# Patient Record
Sex: FEMALE | Race: WHITE | NOT HISPANIC OR LATINO
[De-identification: names, ages, dates, MRNs, and addresses within clinical notes are randomized per-mention and may not be internally consistent; named-entity substitution may affect disease eponyms.]

---

## 2014-12-13 RX ORDER — ASPIRIN/CALCIUM CARB/MAGNESIUM 324 MG
1 TABLET ORAL
Qty: 0 | Refills: 0 | COMMUNITY
Start: 2014-12-13

## 2017-01-03 ENCOUNTER — RX RENEWAL (OUTPATIENT)
Age: 82
End: 2017-01-03

## 2017-02-02 ENCOUNTER — RX RENEWAL (OUTPATIENT)
Age: 82
End: 2017-02-02

## 2017-02-21 ENCOUNTER — APPOINTMENT (OUTPATIENT)
Dept: PULMONOLOGY | Facility: CLINIC | Age: 82
End: 2017-02-21

## 2017-02-21 VITALS
HEART RATE: 81 BPM | SYSTOLIC BLOOD PRESSURE: 118 MMHG | BODY MASS INDEX: 17.75 KG/M2 | OXYGEN SATURATION: 96 % | DIASTOLIC BLOOD PRESSURE: 60 MMHG | WEIGHT: 110 LBS

## 2017-02-21 DIAGNOSIS — Z87.891 PERSONAL HISTORY OF NICOTINE DEPENDENCE: ICD-10-CM

## 2017-02-28 ENCOUNTER — LABORATORY RESULT (OUTPATIENT)
Age: 82
End: 2017-02-28

## 2017-02-28 ENCOUNTER — APPOINTMENT (OUTPATIENT)
Dept: FAMILY MEDICINE | Facility: CLINIC | Age: 82
End: 2017-02-28

## 2017-02-28 VITALS
SYSTOLIC BLOOD PRESSURE: 126 MMHG | OXYGEN SATURATION: 98 % | HEIGHT: 66 IN | BODY MASS INDEX: 17.68 KG/M2 | WEIGHT: 110 LBS | HEART RATE: 75 BPM | RESPIRATION RATE: 12 BRPM | DIASTOLIC BLOOD PRESSURE: 74 MMHG

## 2017-02-28 DIAGNOSIS — R06.2 WHEEZING: ICD-10-CM

## 2017-02-28 DIAGNOSIS — Z23 ENCOUNTER FOR IMMUNIZATION: ICD-10-CM

## 2017-02-28 DIAGNOSIS — R06.00 DYSPNEA, UNSPECIFIED: ICD-10-CM

## 2017-02-28 DIAGNOSIS — M25.562 PAIN IN LEFT KNEE: ICD-10-CM

## 2017-02-28 DIAGNOSIS — M25.559 PAIN IN UNSPECIFIED HIP: ICD-10-CM

## 2017-03-01 LAB
24R-OH-CALCIDIOL SERPL-MCNC: 35.6 PG/ML
ALBUMIN SERPL ELPH-MCNC: 3.9 G/DL
ALP BLD-CCNC: 77 U/L
ALT SERPL-CCNC: 14 U/L
ANION GAP SERPL CALC-SCNC: 16 MMOL/L
AST SERPL-CCNC: 20 U/L
BASOPHILS # BLD AUTO: 0.03 K/UL
BASOPHILS NFR BLD AUTO: 0.6 %
BILIRUB SERPL-MCNC: 0.3 MG/DL
BUN SERPL-MCNC: 11 MG/DL
CALCIUM SERPL-MCNC: 9.4 MG/DL
CHLORIDE SERPL-SCNC: 102 MMOL/L
CHOLEST SERPL-MCNC: 135 MG/DL
CHOLEST/HDLC SERPL: 1.7 RATIO
CO2 SERPL-SCNC: 25 MMOL/L
CREAT SERPL-MCNC: 0.68 MG/DL
EOSINOPHIL # BLD AUTO: 0.05 K/UL
EOSINOPHIL NFR BLD AUTO: 0.9 %
ERYTHROCYTE [SEDIMENTATION RATE] IN BLOOD BY WESTERGREN METHOD: 31 MM/HR
FOLATE SERPL-MCNC: 11.6 NG/ML
GLUCOSE SERPL-MCNC: 83 MG/DL
HCT VFR BLD CALC: 30.1 %
HDLC SERPL-MCNC: 80 MG/DL
HGB BLD-MCNC: 8.9 G/DL
IMM GRANULOCYTES NFR BLD AUTO: 0.4 %
LDLC SERPL CALC-MCNC: 42 MG/DL
LYMPHOCYTES # BLD AUTO: 0.83 K/UL
LYMPHOCYTES NFR BLD AUTO: 15.7 %
MAN DIFF?: NORMAL
MCHC RBC-ENTMCNC: 19.9 PG
MCHC RBC-ENTMCNC: 29.6 GM/DL
MCV RBC AUTO: 67.2 FL
MONOCYTES # BLD AUTO: 0.32 K/UL
MONOCYTES NFR BLD AUTO: 6 %
NEUTROPHILS # BLD AUTO: 4.05 K/UL
NEUTROPHILS NFR BLD AUTO: 76.4 %
PLATELET # BLD AUTO: 330 K/UL
POTASSIUM SERPL-SCNC: 3.7 MMOL/L
PROT SERPL-MCNC: 6.2 G/DL
RBC # BLD: 4.48 M/UL
RBC # FLD: 16.7 %
SODIUM SERPL-SCNC: 143 MMOL/L
TRIGL SERPL-MCNC: 65 MG/DL
TSH SERPL-ACNC: 4.19 UIU/ML
VIT B12 SERPL-MCNC: 300 PG/ML
WBC # FLD AUTO: 5.3 K/UL

## 2017-04-10 ENCOUNTER — RX RENEWAL (OUTPATIENT)
Age: 82
End: 2017-04-10

## 2017-04-24 ENCOUNTER — RX RENEWAL (OUTPATIENT)
Age: 82
End: 2017-04-24

## 2017-05-04 ENCOUNTER — LABORATORY RESULT (OUTPATIENT)
Age: 82
End: 2017-05-04

## 2017-05-05 ENCOUNTER — APPOINTMENT (OUTPATIENT)
Dept: FAMILY MEDICINE | Facility: CLINIC | Age: 82
End: 2017-05-05

## 2017-05-05 VITALS
DIASTOLIC BLOOD PRESSURE: 58 MMHG | BODY MASS INDEX: 17.68 KG/M2 | HEART RATE: 74 BPM | WEIGHT: 110 LBS | TEMPERATURE: 99.2 F | OXYGEN SATURATION: 96 % | HEIGHT: 66 IN | SYSTOLIC BLOOD PRESSURE: 134 MMHG

## 2017-05-09 ENCOUNTER — RESULT REVIEW (OUTPATIENT)
Age: 82
End: 2017-05-09

## 2017-05-09 LAB
ALBUMIN SERPL ELPH-MCNC: 4.1 G/DL
ALP BLD-CCNC: 88 U/L
ALT SERPL-CCNC: 12 U/L
ANION GAP SERPL CALC-SCNC: 15 MMOL/L
AST SERPL-CCNC: 23 U/L
BASOPHILS # BLD AUTO: 0.05 K/UL
BASOPHILS NFR BLD AUTO: 0.7 %
BILIRUB SERPL-MCNC: 0.3 MG/DL
BUN SERPL-MCNC: 13 MG/DL
CALCIUM SERPL-MCNC: 9.8 MG/DL
CHLORIDE SERPL-SCNC: 101 MMOL/L
CO2 SERPL-SCNC: 25 MMOL/L
CREAT SERPL-MCNC: 0.82 MG/DL
EOSINOPHIL # BLD AUTO: 0.1 K/UL
EOSINOPHIL NFR BLD AUTO: 1.4 %
GLUCOSE SERPL-MCNC: 89 MG/DL
HCT VFR BLD CALC: 30.6 %
HGB BLD-MCNC: 9.3 G/DL
IMM GRANULOCYTES NFR BLD AUTO: 0.4 %
LYMPHOCYTES # BLD AUTO: 1.05 K/UL
LYMPHOCYTES NFR BLD AUTO: 15.2 %
MAN DIFF?: NORMAL
MCHC RBC-ENTMCNC: 19.7 PG
MCHC RBC-ENTMCNC: 30.4 GM/DL
MCV RBC AUTO: 64.8 FL
MONOCYTES # BLD AUTO: 0.53 K/UL
MONOCYTES NFR BLD AUTO: 7.7 %
NEUTROPHILS # BLD AUTO: 5.14 K/UL
NEUTROPHILS NFR BLD AUTO: 74.6 %
PLATELET # BLD AUTO: 421 K/UL
POTASSIUM SERPL-SCNC: 4.4 MMOL/L
PROT SERPL-MCNC: 6.7 G/DL
RBC # BLD: 4.72 M/UL
RBC # FLD: 16.2 %
SODIUM SERPL-SCNC: 141 MMOL/L
WBC # FLD AUTO: 6.9 K/UL

## 2017-05-12 ENCOUNTER — APPOINTMENT (OUTPATIENT)
Dept: FAMILY MEDICINE | Facility: CLINIC | Age: 82
End: 2017-05-12

## 2017-05-12 VITALS
TEMPERATURE: 98 F | OXYGEN SATURATION: 98 % | BODY MASS INDEX: 17.68 KG/M2 | DIASTOLIC BLOOD PRESSURE: 52 MMHG | RESPIRATION RATE: 10 BRPM | HEART RATE: 81 BPM | SYSTOLIC BLOOD PRESSURE: 126 MMHG | HEIGHT: 66 IN | WEIGHT: 110 LBS

## 2017-05-12 DIAGNOSIS — Z86.2 PERSONAL HISTORY OF DISEASES OF THE BLOOD AND BLOOD-FORMING ORGANS AND CERTAIN DISORDERS INVOLVING THE IMMUNE MECHANISM: ICD-10-CM

## 2017-05-14 ENCOUNTER — EMERGENCY (EMERGENCY)
Facility: HOSPITAL | Age: 82
LOS: 1 days | Discharge: HOME | End: 2017-05-14
Attending: EMERGENCY MEDICINE
Payer: MEDICARE

## 2017-05-14 VITALS
WEIGHT: 110.01 LBS | TEMPERATURE: 98 F | RESPIRATION RATE: 20 BRPM | HEIGHT: 66 IN | DIASTOLIC BLOOD PRESSURE: 78 MMHG | SYSTOLIC BLOOD PRESSURE: 139 MMHG | HEART RATE: 77 BPM | OXYGEN SATURATION: 96 %

## 2017-05-14 LAB
ANISOCYTOSIS BLD QL: SLIGHT — SIGNIFICANT CHANGE UP
APTT BLD: 27.6 SEC — SIGNIFICANT CHANGE UP (ref 27.5–37.4)
BASOPHILS NFR BLD AUTO: 1 % — SIGNIFICANT CHANGE UP (ref 0–2)
ELLIPTOCYTES BLD QL SMEAR: SLIGHT — SIGNIFICANT CHANGE UP
HCT VFR BLD CALC: 26.9 % — LOW (ref 37–47)
HGB BLD-MCNC: 8.3 G/DL — LOW (ref 12–16)
HYPOCHROMIA BLD QL: SLIGHT — SIGNIFICANT CHANGE UP
INR BLD: 1.04 RATIO — SIGNIFICANT CHANGE UP (ref 0.88–1.16)
LYMPHOCYTES # BLD AUTO: 8 % — LOW (ref 20–55)
MACROCYTES BLD QL: SLIGHT — SIGNIFICANT CHANGE UP
MCHC RBC-ENTMCNC: 20 PG — LOW (ref 27–31)
MCHC RBC-ENTMCNC: 30.9 G/DL — LOW (ref 32–36)
MCV RBC AUTO: 64.7 FL — LOW (ref 81–99)
MONOCYTES NFR BLD AUTO: 5 % — SIGNIFICANT CHANGE UP (ref 3–10)
NEUTROPHILS NFR BLD AUTO: 86 % — HIGH (ref 37–73)
OVALOCYTES BLD QL SMEAR: SLIGHT — SIGNIFICANT CHANGE UP
PLAT MORPH BLD: NORMAL — SIGNIFICANT CHANGE UP
PLATELET # BLD AUTO: 342 K/UL — SIGNIFICANT CHANGE UP (ref 150–400)
POIKILOCYTOSIS BLD QL AUTO: SLIGHT — SIGNIFICANT CHANGE UP
POLYCHROMASIA BLD QL SMEAR: SLIGHT — SIGNIFICANT CHANGE UP
PROTHROM AB SERPL-ACNC: 11.4 SEC — SIGNIFICANT CHANGE UP (ref 9.8–12.7)
RBC # BLD: 4.16 M/UL — LOW (ref 4.4–5.2)
RBC # FLD: 16 % — HIGH (ref 11–15.6)
RBC BLD AUTO: ABNORMAL
WBC # BLD: 7.1 K/UL — SIGNIFICANT CHANGE UP (ref 4.8–10.8)
WBC # FLD AUTO: 7.1 K/UL — SIGNIFICANT CHANGE UP (ref 4.8–10.8)

## 2017-05-14 PROCEDURE — 99284 EMERGENCY DEPT VISIT MOD MDM: CPT

## 2017-05-14 PROCEDURE — 99284 EMERGENCY DEPT VISIT MOD MDM: CPT | Mod: 25

## 2017-05-14 PROCEDURE — 85027 COMPLETE CBC AUTOMATED: CPT

## 2017-05-14 PROCEDURE — 85610 PROTHROMBIN TIME: CPT

## 2017-05-14 PROCEDURE — 93971 EXTREMITY STUDY: CPT

## 2017-05-14 PROCEDURE — 73590 X-RAY EXAM OF LOWER LEG: CPT

## 2017-05-14 PROCEDURE — 85730 THROMBOPLASTIN TIME PARTIAL: CPT

## 2017-05-14 PROCEDURE — 93971 EXTREMITY STUDY: CPT | Mod: 26,RT

## 2017-05-14 PROCEDURE — 73590 X-RAY EXAM OF LOWER LEG: CPT | Mod: 26,RT

## 2017-05-14 NOTE — ED STATDOCS - ATTENDING CONTRIBUTION TO CARE
I, Donald Zuniga, performed the initial face to face bedside interview with this patient regarding history of present illness, review of symptoms and relevant past medical, social and family history.  I completed an independent physical examination.  I was the provider who initially evaluated this patient.  The history, relevant review of systems, past medical and surgical history, medical decision making, and physical examination was documented by the scribe in my presence and I attest to the accuracy of the documentation. Follow-up on ordered tests (ie labs, radiologic studies) and re-evaluation of the patient's status has been communicated to the ACP.  Disposition of the patient will be based on test outcome and response to ED interventions.

## 2017-05-14 NOTE — ED STATDOCS - PMH
COPD (chronic obstructive pulmonary disease)    Diabetes    High cholesterol    Thalassanemia, minor

## 2017-05-14 NOTE — ED STATDOCS - NS ED MD SCRIBE ATTENDING SCRIBE SECTIONS
HISTORY OF PRESENT ILLNESS/HIV/REVIEW OF SYSTEMS/INTAKE ASSESSMENT/SCREENINGS/PHYSICAL EXAM/DISPOSITION/PAST MEDICAL/SURGICAL/SOCIAL HISTORY/VITAL SIGNS( Pullset)

## 2017-05-14 NOTE — ED STATDOCS - OBJECTIVE STATEMENT
84 y/o F with hx of HTN, DM, and HLD presenting to the ED complaining of bruising to RLE. Son remarks that pt had slight swelling to RLE. This morning they noticed a bruise to RLE. They called their cousin, an RN, who evaluated to leg and found it to be swollen and tender. Pt was recently seen by PMD and put on Cipro x 2 weeks ago. Son adds that pt has a habit of picking her skin. Pt wheelchair bound at baseline x 17 months s/p right hip fx.   Medication: Plavix and currently on Cipro 86 y/o F with hx of HTN, DM, and HLD presenting to the ED complaining of bruising to RLE. Son remarks that he noted slight swelling to RLE last night, prior to patient going to bed. This morning they noticed a bruise to RLE. Patient denies trauma/ injury but does take plavix.  Patient denies leg pain, weakness, lightheadedness.  In addition, patient has had an infection of the opposite leg for more than 3 weeks: blister that opened up and became infected: Pt was recently seen by PMD and put on Cipro x 2 weeks ago.  Denies fever.  Son adds that pt has a habit of picking her skin. Pt wheelchair bound at baseline x 17 months s/p right hip fx.   Medication: Plavix and currently on Cipro

## 2017-05-14 NOTE — ED STATDOCS - SKIN, MLM
LLE erosion 3x2cm ,oval shape, to anterior aspect of the distal tibia with minimal surround erythema, 6x5cm hematoma to medial aspect of right tibia, surrounding warm, no erythema, +ecchymosis. dry skin. Bruises b/l upper extremities. LLE erosion 3x2cm ,oval shape, to anterior aspect of the distal tibia with minimal surround erythema.  6x5cm hematoma to medial aspect of right tibia, surrounding warm, no erythema, +ecchymosis. dry skin. Bruises b/l upper extremities.

## 2017-05-14 NOTE — ED STATDOCS - PROGRESS NOTE DETAILS
NP NOTE: Pt seen by intake physician and HPI/ROS/PE/MDM reviewed. Pt seen and evaluated. Discussed plan and any resulted studies at this time. Will continue to monitor and re-evaluate.  Re-Evaluation: pt pending studies, + hematoma/contusion that is warm and tender to palpation right medial aspect of lower egg. discussed CBC and xray results with pt and family. pt going to US at this time, will continue to monitor. Labs and imaging results reviewed.  Patient advised of results. Anticipatory guidance provided. will apply light ace wrap, fu with pmd.

## 2017-05-14 NOTE — ED ADULT NURSE NOTE - OBJECTIVE STATEMENT
Pt A&Ox3, no c/o pain at this time, has bruising to RLE with swelling +2 edema, warm and tender to touch, positive distal pulses. Pt is immobile bound to wheelchair. Has healing scab to LLE, on antibiotics for infection at that site. Pt son at bedside HCP,

## 2017-05-14 NOTE — ED ADULT TRIAGE NOTE - CHIEF COMPLAINT QUOTE
pt brought to er with bruise and pain  to right lower leg . unknown if pt sustained any injury. pt is on plavix. pt with minor lac to right wrist. pt with hx of copd

## 2017-05-14 NOTE — ED STATDOCS - MEDICAL DECISION MAKING DETAILS
chronic wound LLE, hematoma RLE, evaluate for coag abnormality and DVT. if negative outpt management with ace bandages and f/u with PMD. chronic wound LLE: on antibiotics.  hematoma and swelling RLE, evaluate for coag abnormality and DVT. if negative outpt wound care management with ace bandages and f/u with PMD.

## 2017-05-19 ENCOUNTER — APPOINTMENT (OUTPATIENT)
Dept: FAMILY MEDICINE | Facility: CLINIC | Age: 82
End: 2017-05-19

## 2017-05-19 VITALS
HEIGHT: 66 IN | RESPIRATION RATE: 97 BRPM | BODY MASS INDEX: 17.36 KG/M2 | TEMPERATURE: 98.6 F | DIASTOLIC BLOOD PRESSURE: 38 MMHG | HEART RATE: 65 BPM | SYSTOLIC BLOOD PRESSURE: 112 MMHG | WEIGHT: 108 LBS

## 2017-05-26 ENCOUNTER — APPOINTMENT (OUTPATIENT)
Dept: FAMILY MEDICINE | Facility: CLINIC | Age: 82
End: 2017-05-26

## 2017-05-26 VITALS
HEIGHT: 66 IN | WEIGHT: 108 LBS | BODY MASS INDEX: 17.36 KG/M2 | HEART RATE: 86 BPM | TEMPERATURE: 98.1 F | DIASTOLIC BLOOD PRESSURE: 50 MMHG | RESPIRATION RATE: 10 BRPM | SYSTOLIC BLOOD PRESSURE: 116 MMHG | OXYGEN SATURATION: 98 %

## 2017-05-26 DIAGNOSIS — L03.115 CELLULITIS OF RIGHT LOWER LIMB: ICD-10-CM

## 2017-06-04 ENCOUNTER — EMERGENCY (EMERGENCY)
Facility: HOSPITAL | Age: 82
LOS: 1 days | Discharge: HOME | End: 2017-06-04
Attending: EMERGENCY MEDICINE | Admitting: EMERGENCY MEDICINE
Payer: MEDICARE

## 2017-06-04 VITALS
SYSTOLIC BLOOD PRESSURE: 135 MMHG | WEIGHT: 130.07 LBS | DIASTOLIC BLOOD PRESSURE: 60 MMHG | RESPIRATION RATE: 16 BRPM | HEIGHT: 66 IN | OXYGEN SATURATION: 98 % | HEART RATE: 89 BPM | TEMPERATURE: 98 F
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
## 2017-06-04 LAB
ALBUMIN SERPL ELPH-MCNC: 3.9 G/DL — SIGNIFICANT CHANGE UP (ref 3.3–5.2)
ALP SERPL-CCNC: 118 U/L — SIGNIFICANT CHANGE UP (ref 40–120)
ALT FLD-CCNC: 13 U/L — SIGNIFICANT CHANGE UP
ANION GAP SERPL CALC-SCNC: 13 MMOL/L — SIGNIFICANT CHANGE UP (ref 5–17)
ANISOCYTOSIS BLD QL: SIGNIFICANT CHANGE UP
APTT BLD: 27.1 SEC — LOW (ref 27.5–37.4)
AST SERPL-CCNC: 24 U/L — SIGNIFICANT CHANGE UP
BASOPHILS # BLD AUTO: 0 K/UL — SIGNIFICANT CHANGE UP (ref 0–0.2)
BASOPHILS NFR BLD AUTO: 0.2 % — SIGNIFICANT CHANGE UP (ref 0–2)
BILIRUB SERPL-MCNC: 0.5 MG/DL — SIGNIFICANT CHANGE UP (ref 0.4–2)
BLD GP AB SCN SERPL QL: SIGNIFICANT CHANGE UP
BUN SERPL-MCNC: 11 MG/DL — SIGNIFICANT CHANGE UP (ref 8–20)
CALCIUM SERPL-MCNC: 8.8 MG/DL — SIGNIFICANT CHANGE UP (ref 8.6–10.2)
CHLORIDE SERPL-SCNC: 101 MMOL/L — SIGNIFICANT CHANGE UP (ref 98–107)
CO2 SERPL-SCNC: 26 MMOL/L — SIGNIFICANT CHANGE UP (ref 22–29)
CREAT SERPL-MCNC: 0.67 MG/DL — SIGNIFICANT CHANGE UP (ref 0.5–1.3)
DACRYOCYTES BLD QL SMEAR: SLIGHT — SIGNIFICANT CHANGE UP
ELLIPTOCYTES BLD QL SMEAR: SIGNIFICANT CHANGE UP
EOSINOPHIL # BLD AUTO: 0.1 K/UL — SIGNIFICANT CHANGE UP (ref 0–0.5)
EOSINOPHIL NFR BLD AUTO: 1.2 % — SIGNIFICANT CHANGE UP (ref 0–6)
GLUCOSE SERPL-MCNC: 153 MG/DL — HIGH (ref 70–115)
HCT VFR BLD CALC: 25.4 % — LOW (ref 37–47)
HGB BLD-MCNC: 7.6 G/DL — LOW (ref 12–16)
HYPOCHROMIA BLD QL: SIGNIFICANT CHANGE UP
INR BLD: 1.06 RATIO — SIGNIFICANT CHANGE UP (ref 0.88–1.16)
LYMPHOCYTES # BLD AUTO: 1 K/UL — SIGNIFICANT CHANGE UP (ref 1–4.8)
LYMPHOCYTES # BLD AUTO: 11.7 % — LOW (ref 20–55)
MACROCYTES BLD QL: SLIGHT — SIGNIFICANT CHANGE UP
MCHC RBC-ENTMCNC: 19.3 PG — LOW (ref 27–31)
MCHC RBC-ENTMCNC: 29.9 G/DL — LOW (ref 32–36)
MCV RBC AUTO: 64.6 FL — LOW (ref 81–99)
MICROCYTES BLD QL: SIGNIFICANT CHANGE UP
MONOCYTES # BLD AUTO: 0.6 K/UL — SIGNIFICANT CHANGE UP (ref 0–0.8)
MONOCYTES NFR BLD AUTO: 6.7 % — SIGNIFICANT CHANGE UP (ref 3–10)
NEUTROPHILS # BLD AUTO: 6.7 K/UL — SIGNIFICANT CHANGE UP (ref 1.8–8)
NEUTROPHILS NFR BLD AUTO: 79.7 % — HIGH (ref 37–73)
OVALOCYTES BLD QL SMEAR: SIGNIFICANT CHANGE UP
PLAT MORPH BLD: NORMAL — SIGNIFICANT CHANGE UP
PLATELET # BLD AUTO: 549 K/UL — HIGH (ref 150–400)
PLATELET COUNT - ESTIMATE: ABNORMAL
POIKILOCYTOSIS BLD QL AUTO: SIGNIFICANT CHANGE UP
POLYCHROMASIA BLD QL SMEAR: SLIGHT — SIGNIFICANT CHANGE UP
POTASSIUM SERPL-MCNC: 3.9 MMOL/L — SIGNIFICANT CHANGE UP (ref 3.5–5.3)
POTASSIUM SERPL-SCNC: 3.9 MMOL/L — SIGNIFICANT CHANGE UP (ref 3.5–5.3)
PROT SERPL-MCNC: 6.9 G/DL — SIGNIFICANT CHANGE UP (ref 6.6–8.7)
PROTHROM AB SERPL-ACNC: 11.7 SEC — SIGNIFICANT CHANGE UP (ref 9.8–12.7)
RBC # BLD: 3.93 M/UL — LOW (ref 4.4–5.2)
RBC # FLD: 15.5 % — SIGNIFICANT CHANGE UP (ref 11–15.6)
RBC BLD AUTO: ABNORMAL
SCHISTOCYTES BLD QL AUTO: SLIGHT — SIGNIFICANT CHANGE UP
SODIUM SERPL-SCNC: 140 MMOL/L — SIGNIFICANT CHANGE UP (ref 135–145)
TARGETS BLD QL SMEAR: SLIGHT — SIGNIFICANT CHANGE UP
TYPE + AB SCN PNL BLD: SIGNIFICANT CHANGE UP
WBC # BLD: 8.4 K/UL — SIGNIFICANT CHANGE UP (ref 4.8–10.8)
WBC # FLD AUTO: 8.4 K/UL — SIGNIFICANT CHANGE UP (ref 4.8–10.8)

## 2017-06-04 PROCEDURE — 73090 X-RAY EXAM OF FOREARM: CPT

## 2017-06-04 PROCEDURE — 85730 THROMBOPLASTIN TIME PARTIAL: CPT

## 2017-06-04 PROCEDURE — 86900 BLOOD TYPING SEROLOGIC ABO: CPT

## 2017-06-04 PROCEDURE — 90715 TDAP VACCINE 7 YRS/> IM: CPT

## 2017-06-04 PROCEDURE — 71010: CPT | Mod: 26

## 2017-06-04 PROCEDURE — 86901 BLOOD TYPING SEROLOGIC RH(D): CPT

## 2017-06-04 PROCEDURE — 72125 CT NECK SPINE W/O DYE: CPT

## 2017-06-04 PROCEDURE — 70450 CT HEAD/BRAIN W/O DYE: CPT | Mod: 26

## 2017-06-04 PROCEDURE — 99284 EMERGENCY DEPT VISIT MOD MDM: CPT | Mod: 25

## 2017-06-04 PROCEDURE — 99284 EMERGENCY DEPT VISIT MOD MDM: CPT

## 2017-06-04 PROCEDURE — 85027 COMPLETE CBC AUTOMATED: CPT

## 2017-06-04 PROCEDURE — 73060 X-RAY EXAM OF HUMERUS: CPT

## 2017-06-04 PROCEDURE — 72125 CT NECK SPINE W/O DYE: CPT | Mod: 26

## 2017-06-04 PROCEDURE — 70450 CT HEAD/BRAIN W/O DYE: CPT

## 2017-06-04 PROCEDURE — 71045 X-RAY EXAM CHEST 1 VIEW: CPT

## 2017-06-04 PROCEDURE — 86850 RBC ANTIBODY SCREEN: CPT

## 2017-06-04 PROCEDURE — 90471 IMMUNIZATION ADMIN: CPT

## 2017-06-04 PROCEDURE — 73090 X-RAY EXAM OF FOREARM: CPT | Mod: 26,LT

## 2017-06-04 PROCEDURE — 80053 COMPREHEN METABOLIC PANEL: CPT

## 2017-06-04 PROCEDURE — 73060 X-RAY EXAM OF HUMERUS: CPT | Mod: 26,RT

## 2017-06-04 PROCEDURE — 85610 PROTHROMBIN TIME: CPT

## 2017-06-04 RX ORDER — TETANUS TOXOID, REDUCED DIPHTHERIA TOXOID AND ACELLULAR PERTUSSIS VACCINE, ADSORBED 5; 2.5; 8; 8; 2.5 [IU]/.5ML; [IU]/.5ML; UG/.5ML; UG/.5ML; UG/.5ML
0.5 SUSPENSION INTRAMUSCULAR ONCE
Qty: 0 | Refills: 0 | Status: COMPLETED | OUTPATIENT
Start: 2017-06-04 | End: 2017-06-04

## 2017-06-04 RX ORDER — SODIUM CHLORIDE 9 MG/ML
1000 INJECTION INTRAMUSCULAR; INTRAVENOUS; SUBCUTANEOUS
Qty: 0 | Refills: 0 | Status: DISCONTINUED | OUTPATIENT
Start: 2017-06-04 | End: 2017-06-08

## 2017-06-04 RX ADMIN — SODIUM CHLORIDE 10 MILLILITER(S): 9 INJECTION INTRAMUSCULAR; INTRAVENOUS; SUBCUTANEOUS at 13:03

## 2017-06-04 RX ADMIN — TETANUS TOXOID, REDUCED DIPHTHERIA TOXOID AND ACELLULAR PERTUSSIS VACCINE, ADSORBED 0.5 MILLILITER(S): 5; 2.5; 8; 8; 2.5 SUSPENSION INTRAMUSCULAR at 13:02

## 2017-06-04 NOTE — H&P ADULT - NSHPPHYSICALEXAM_GEN_ALL_CORE
Vital Signs Last 24 Hrs  T(C): 36.7, Max: 36.7 (06-04 @ 11:39)  T(F): 98.1, Max: 98.1 (06-04 @ 11:39)  HR: 89 (89 - 89)  BP: 135/60 (135/60 - 135/60)  BP(mean): --  RR: 16 (16 - 16)  SpO2: 98% (98% - 98%)    Primary:  Airway patent  Breath sounds bilaterally  Femoral pulses 2+ bilaterally, warm  GCS 15, pupils 3mm equal and reactive, no deformities  Full exposure    PE  Gen: NAD, AAOx3  HEENT: scalp atraumatic, small superficial laceration of nasal bridge  Chest: nontender  Pulm: CTAB  CV: RRR, normal intensity s1/s2  Abd: soft, nontender  : no blood in perineum  Back: no stepoff  Ext: moving all extremities  Vasc: 2+ peripheral pulses  Neuro: GCS 15, nonfocal

## 2017-06-04 NOTE — ED ADULT NURSE NOTE - CHIEF COMPLAINT QUOTE
BIBA, patient is awake and disoriented at baseline due dementia, s/p fall from wheelchair, + head injury, on plavix, code trauma b called by Ed staff

## 2017-06-04 NOTE — H&P ADULT - NSHPLABSRESULTS_GEN_ALL_CORE
I&O's Detail      LABS:                        7.6    8.4   )-----------( 549      ( 04 Jun 2017 12:15 )             25.4     06-04    140  |  101  |  11.0  ----------------------------<  153<H>  3.9   |  26.0  |  0.67    Ca    8.8      04 Jun 2017 12:15    TPro  6.9  /  Alb  3.9  /  TBili  0.5  /  DBili  x   /  AST  24  /  ALT  13  /  AlkPhos  118  06-04    PT/INR - ( 04 Jun 2017 12:15 )   PT: 11.7 sec;   INR: 1.06 ratio         PTT - ( 04 Jun 2017 12:15 )  PTT:27.1 sec      RADIOLOGY & ADDITIONAL STUDIES:  CT HEAD:  1.  No acute intracranial hemorrhage, mass effect, or evidence of acute   displaced calvarial fracture.  2.  Right frontal scalp hematoma.  3.  Mildly displaced right nasal bone fracture.    CT CERVICAL SPINE:  No acute displaced cervical spine fracture or evidence of traumatic   malalignment.

## 2017-06-04 NOTE — ED PROVIDER NOTE - OBJECTIVE STATEMENT
86 yo female s/p fall from wheelchair hitting head no loc reported, pt on blood thinners and h/o dementia. pt noticed to have swelling to rt arm/rt orbital area  denies cp/sob or neck pain

## 2017-06-04 NOTE — H&P ADULT - HISTORY OF PRESENT ILLNESS
Patient is an 85 year old female s/p fall from standing, on plavix, witnessed, negative LOC, pt states she tripped while walking, remembers the entire event. Pt states she fell forward on to her face; complaining of facial pain. Pt has dementia and is poor historian.    Primary survey intact, CXR negative for acute pathology.

## 2017-06-04 NOTE — ED PROVIDER NOTE - PROGRESS NOTE DETAILS
all orders as per trauma team dr kennedy present on pt arrival cleared by trauma for d/c  family requesting to take pt home aware of anemia has thalesemia

## 2017-06-06 ENCOUNTER — APPOINTMENT (OUTPATIENT)
Dept: FAMILY MEDICINE | Facility: CLINIC | Age: 82
End: 2017-06-06

## 2017-06-08 ENCOUNTER — APPOINTMENT (OUTPATIENT)
Dept: FAMILY MEDICINE | Facility: CLINIC | Age: 82
End: 2017-06-08

## 2017-06-08 VITALS
RESPIRATION RATE: 16 BRPM | TEMPERATURE: 98.4 F | HEIGHT: 66 IN | HEART RATE: 83 BPM | WEIGHT: 108 LBS | OXYGEN SATURATION: 98 % | BODY MASS INDEX: 17.36 KG/M2 | SYSTOLIC BLOOD PRESSURE: 120 MMHG | DIASTOLIC BLOOD PRESSURE: 58 MMHG

## 2017-06-12 ENCOUNTER — RX RENEWAL (OUTPATIENT)
Age: 82
End: 2017-06-12

## 2017-06-14 ENCOUNTER — RX RENEWAL (OUTPATIENT)
Age: 82
End: 2017-06-14

## 2017-06-16 ENCOUNTER — APPOINTMENT (OUTPATIENT)
Dept: FAMILY MEDICINE | Facility: CLINIC | Age: 82
End: 2017-06-16

## 2017-06-16 VITALS
OXYGEN SATURATION: 98 % | HEART RATE: 72 BPM | WEIGHT: 108 LBS | HEIGHT: 66 IN | BODY MASS INDEX: 17.36 KG/M2 | SYSTOLIC BLOOD PRESSURE: 120 MMHG | DIASTOLIC BLOOD PRESSURE: 68 MMHG | RESPIRATION RATE: 12 BRPM

## 2017-07-06 ENCOUNTER — APPOINTMENT (OUTPATIENT)
Dept: FAMILY MEDICINE | Facility: CLINIC | Age: 82
End: 2017-07-06

## 2017-07-06 ENCOUNTER — LABORATORY RESULT (OUTPATIENT)
Age: 82
End: 2017-07-06

## 2017-07-06 VITALS
DIASTOLIC BLOOD PRESSURE: 66 MMHG | WEIGHT: 108 LBS | HEART RATE: 81 BPM | HEIGHT: 66 IN | SYSTOLIC BLOOD PRESSURE: 122 MMHG | BODY MASS INDEX: 17.36 KG/M2 | OXYGEN SATURATION: 98 % | RESPIRATION RATE: 12 BRPM | TEMPERATURE: 98.4 F

## 2017-07-10 LAB
ALBUMIN SERPL ELPH-MCNC: 3.9 G/DL
ALP BLD-CCNC: 124 U/L
ALT SERPL-CCNC: 10 U/L
ANION GAP SERPL CALC-SCNC: 16 MMOL/L
AST SERPL-CCNC: 18 U/L
BASOPHILS # BLD AUTO: 0.02 K/UL
BASOPHILS NFR BLD AUTO: 0.3 %
BILIRUB SERPL-MCNC: 0.3 MG/DL
BUN SERPL-MCNC: 16 MG/DL
CALCIUM SERPL-MCNC: 9.5 MG/DL
CHLORIDE SERPL-SCNC: 104 MMOL/L
CHOLEST SERPL-MCNC: 135 MG/DL
CHOLEST/HDLC SERPL: 1.8 RATIO
CO2 SERPL-SCNC: 24 MMOL/L
CREAT SERPL-MCNC: 0.9 MG/DL
EOSINOPHIL # BLD AUTO: 0.1 K/UL
EOSINOPHIL NFR BLD AUTO: 1.7 %
GLUCOSE SERPL-MCNC: 139 MG/DL
HBA1C MFR BLD HPLC: 5.4 %
HCT VFR BLD CALC: 25.7 %
HDLC SERPL-MCNC: 73 MG/DL
HGB BLD-MCNC: 7.3 G/DL
IMM GRANULOCYTES NFR BLD AUTO: 0.7 %
LDLC SERPL CALC-MCNC: 50 MG/DL
LYMPHOCYTES # BLD AUTO: 0.88 K/UL
LYMPHOCYTES NFR BLD AUTO: 14.6 %
MAN DIFF?: NORMAL
MCHC RBC-ENTMCNC: 18.7 PG
MCHC RBC-ENTMCNC: 28.4 GM/DL
MCV RBC AUTO: 65.9 FL
MONOCYTES # BLD AUTO: 0.46 K/UL
MONOCYTES NFR BLD AUTO: 7.6 %
NEUTROPHILS # BLD AUTO: 4.53 K/UL
NEUTROPHILS NFR BLD AUTO: 75.1 %
PLATELET # BLD AUTO: 433 K/UL
POTASSIUM SERPL-SCNC: 4.1 MMOL/L
PROT SERPL-MCNC: 6.7 G/DL
RBC # BLD: 3.9 M/UL
RBC # FLD: 16.6 %
SODIUM SERPL-SCNC: 144 MMOL/L
TRIGL SERPL-MCNC: 58 MG/DL
WBC # FLD AUTO: 6.03 K/UL

## 2017-07-11 ENCOUNTER — RX RENEWAL (OUTPATIENT)
Age: 82
End: 2017-07-11

## 2017-07-13 ENCOUNTER — RX RENEWAL (OUTPATIENT)
Age: 82
End: 2017-07-13

## 2017-07-14 ENCOUNTER — APPOINTMENT (OUTPATIENT)
Dept: FAMILY MEDICINE | Facility: CLINIC | Age: 82
End: 2017-07-14

## 2017-07-15 ENCOUNTER — INPATIENT (INPATIENT)
Facility: HOSPITAL | Age: 82
LOS: 1 days | Discharge: HOME | DRG: 812 | End: 2017-07-17
Attending: INTERNAL MEDICINE | Admitting: INTERNAL MEDICINE
Payer: MEDICARE

## 2017-07-15 VITALS
DIASTOLIC BLOOD PRESSURE: 54 MMHG | WEIGHT: 128.09 LBS | OXYGEN SATURATION: 96 % | RESPIRATION RATE: 18 BRPM | HEART RATE: 82 BPM | TEMPERATURE: 98 F | HEIGHT: 62 IN | SYSTOLIC BLOOD PRESSURE: 115 MMHG

## 2017-07-15 DIAGNOSIS — J44.9 CHRONIC OBSTRUCTIVE PULMONARY DISEASE, UNSPECIFIED: ICD-10-CM

## 2017-07-15 DIAGNOSIS — F03.90 UNSPECIFIED DEMENTIA, UNSPECIFIED SEVERITY, WITHOUT BEHAVIORAL DISTURBANCE, PSYCHOTIC DISTURBANCE, MOOD DISTURBANCE, AND ANXIETY: ICD-10-CM

## 2017-07-15 DIAGNOSIS — Z71.89 OTHER SPECIFIED COUNSELING: ICD-10-CM

## 2017-07-15 DIAGNOSIS — D64.9 ANEMIA, UNSPECIFIED: ICD-10-CM

## 2017-07-15 DIAGNOSIS — I25.10 ATHEROSCLEROTIC HEART DISEASE OF NATIVE CORONARY ARTERY WITHOUT ANGINA PECTORIS: ICD-10-CM

## 2017-07-15 LAB
ALBUMIN SERPL ELPH-MCNC: 3.9 G/DL — SIGNIFICANT CHANGE UP (ref 3.3–5.2)
ALP SERPL-CCNC: 97 U/L — SIGNIFICANT CHANGE UP (ref 40–120)
ALT FLD-CCNC: 13 U/L — SIGNIFICANT CHANGE UP
ANION GAP SERPL CALC-SCNC: 16 MMOL/L — SIGNIFICANT CHANGE UP (ref 5–17)
APTT BLD: 26.9 SEC — LOW (ref 27.5–37.4)
AST SERPL-CCNC: 19 U/L — SIGNIFICANT CHANGE UP
BILIRUB SERPL-MCNC: 0.4 MG/DL — SIGNIFICANT CHANGE UP (ref 0.4–2)
BLD GP AB SCN SERPL QL: SIGNIFICANT CHANGE UP
BUN SERPL-MCNC: 14 MG/DL — SIGNIFICANT CHANGE UP (ref 8–20)
CALCIUM SERPL-MCNC: 8.9 MG/DL — SIGNIFICANT CHANGE UP (ref 8.6–10.2)
CHLORIDE SERPL-SCNC: 99 MMOL/L — SIGNIFICANT CHANGE UP (ref 98–107)
CO2 SERPL-SCNC: 23 MMOL/L — SIGNIFICANT CHANGE UP (ref 22–29)
CREAT SERPL-MCNC: 0.62 MG/DL — SIGNIFICANT CHANGE UP (ref 0.5–1.3)
GLUCOSE SERPL-MCNC: 107 MG/DL — SIGNIFICANT CHANGE UP (ref 70–115)
HCT VFR BLD CALC: 21.7 % — LOW (ref 37–47)
HGB BLD-MCNC: 6.6 G/DL — CRITICAL LOW (ref 12–16)
INR BLD: 1.04 RATIO — SIGNIFICANT CHANGE UP (ref 0.88–1.16)
MCHC RBC-ENTMCNC: 19 PG — LOW (ref 27–31)
MCHC RBC-ENTMCNC: 30.4 G/DL — LOW (ref 32–36)
MCV RBC AUTO: 62.4 FL — LOW (ref 81–99)
OB PNL STL: NEGATIVE — SIGNIFICANT CHANGE UP
PLATELET # BLD AUTO: 524 K/UL — HIGH (ref 150–400)
POTASSIUM SERPL-MCNC: 3.8 MMOL/L — SIGNIFICANT CHANGE UP (ref 3.5–5.3)
POTASSIUM SERPL-SCNC: 3.8 MMOL/L — SIGNIFICANT CHANGE UP (ref 3.5–5.3)
PROT SERPL-MCNC: 7 G/DL — SIGNIFICANT CHANGE UP (ref 6.6–8.7)
PROTHROM AB SERPL-ACNC: 11.5 SEC — SIGNIFICANT CHANGE UP (ref 9.8–12.7)
RBC # BLD: 3.48 M/UL — LOW (ref 4.4–5.2)
RBC # FLD: 15.7 % — HIGH (ref 11–15.6)
SODIUM SERPL-SCNC: 138 MMOL/L — SIGNIFICANT CHANGE UP (ref 135–145)
TYPE + AB SCN PNL BLD: SIGNIFICANT CHANGE UP
WBC # BLD: 6.8 K/UL — SIGNIFICANT CHANGE UP (ref 4.8–10.8)
WBC # FLD AUTO: 6.8 K/UL — SIGNIFICANT CHANGE UP (ref 4.8–10.8)

## 2017-07-15 PROCEDURE — 71010: CPT | Mod: 26

## 2017-07-15 PROCEDURE — 99285 EMERGENCY DEPT VISIT HI MDM: CPT

## 2017-07-15 PROCEDURE — 99223 1ST HOSP IP/OBS HIGH 75: CPT

## 2017-07-15 RX ORDER — PANTOPRAZOLE SODIUM 20 MG/1
40 TABLET, DELAYED RELEASE ORAL EVERY 12 HOURS
Qty: 0 | Refills: 0 | Status: DISCONTINUED | OUTPATIENT
Start: 2017-07-15 | End: 2017-07-16

## 2017-07-15 RX ORDER — ATORVASTATIN CALCIUM 80 MG/1
10 TABLET, FILM COATED ORAL AT BEDTIME
Qty: 0 | Refills: 0 | Status: DISCONTINUED | OUTPATIENT
Start: 2017-07-15 | End: 2017-07-17

## 2017-07-15 RX ORDER — AMLODIPINE BESYLATE 2.5 MG/1
5 TABLET ORAL DAILY
Qty: 0 | Refills: 0 | Status: DISCONTINUED | OUTPATIENT
Start: 2017-07-15 | End: 2017-07-17

## 2017-07-15 RX ORDER — BUDESONIDE AND FORMOTEROL FUMARATE DIHYDRATE 160; 4.5 UG/1; UG/1
2 AEROSOL RESPIRATORY (INHALATION)
Qty: 0 | Refills: 0 | Status: DISCONTINUED | OUTPATIENT
Start: 2017-07-15 | End: 2017-07-17

## 2017-07-15 RX ORDER — DONEPEZIL HYDROCHLORIDE 10 MG/1
5 TABLET, FILM COATED ORAL AT BEDTIME
Qty: 0 | Refills: 0 | Status: DISCONTINUED | OUTPATIENT
Start: 2017-07-15 | End: 2017-07-17

## 2017-07-15 RX ORDER — IRON SUCROSE 20 MG/ML
200 INJECTION, SOLUTION INTRAVENOUS EVERY 24 HOURS
Qty: 0 | Refills: 0 | Status: DISCONTINUED | OUTPATIENT
Start: 2017-07-15 | End: 2017-07-17

## 2017-07-15 RX ORDER — CIPROFLOXACIN LACTATE 400MG/40ML
1 VIAL (ML) INTRAVENOUS
Qty: 0 | Refills: 0 | COMMUNITY

## 2017-07-15 RX ORDER — FLUOXETINE HCL 10 MG
20 CAPSULE ORAL DAILY
Qty: 0 | Refills: 0 | Status: DISCONTINUED | OUTPATIENT
Start: 2017-07-15 | End: 2017-07-17

## 2017-07-15 RX ADMIN — PANTOPRAZOLE SODIUM 40 MILLIGRAM(S): 20 TABLET, DELAYED RELEASE ORAL at 18:07

## 2017-07-15 RX ADMIN — IRON SUCROSE 110 MILLIGRAM(S): 20 INJECTION, SOLUTION INTRAVENOUS at 18:07

## 2017-07-15 RX ADMIN — ATORVASTATIN CALCIUM 10 MILLIGRAM(S): 80 TABLET, FILM COATED ORAL at 22:53

## 2017-07-15 RX ADMIN — DONEPEZIL HYDROCHLORIDE 5 MILLIGRAM(S): 10 TABLET, FILM COATED ORAL at 23:00

## 2017-07-15 RX ADMIN — BUDESONIDE AND FORMOTEROL FUMARATE DIHYDRATE 2 PUFF(S): 160; 4.5 AEROSOL RESPIRATORY (INHALATION) at 20:30

## 2017-07-15 NOTE — ED PROVIDER NOTE - PROGRESS NOTE DETAILS
Labs as noted.  Pt guaic neg. Will send iron studies and transfuse 2 units PRBC's.  Case d/w Hosptialist/Dr. Kelly for PMD/Dr. Nascimento and will admit

## 2017-07-15 NOTE — H&P ADULT - PROBLEM SELECTOR PLAN 1
suspect chronic given neg occult stool and no BUN elevation.   2U PRBC, check iron panel  venofer/ppi---d/c if no iron deficiency  gi vs heme eval pending studies

## 2017-07-15 NOTE — ED PROVIDER NOTE - CONSTITUTIONAL, MLM
normal... Elderly F awake, alert, oriented to person, place, time/situation and in no apparent distress, poor historian

## 2017-07-15 NOTE — ED ADULT NURSE REASSESSMENT NOTE - NS ED NURSE REASSESS COMMENT FT1
Transfusion completed. Pt. resting comfortably and denies any pain or SOB at this time. Pt. tore out IV, placed new one in right bicep and covered with kurlex. resp even an unlabored. Skin warm and dry. Pt. in no apparent distress at this time.

## 2017-07-15 NOTE — ED ADULT NURSE REASSESSMENT NOTE - GENERAL PATIENT STATE
smiling/interactive/cooperative/comfortable appearance
comfortable appearance/smiling/interactive/cooperative/resting/sleeping

## 2017-07-15 NOTE — ED ADULT NURSE NOTE - PMH
CAD (coronary artery disease)    COPD (chronic obstructive pulmonary disease)    Diabetes    High cholesterol    HTN (hypertension)    Thalassanemia, minor

## 2017-07-15 NOTE — ED ADULT NURSE NOTE - OBJECTIVE STATEMENT
pt sent in by pmd for hemoglobin of 7.9, as per son lower than last blood count few months ago. pt denies any symptoms or complaints at this time. "I feel fine. the doctor sent me." as per son, "she has been a little bit weaker than usual. also her appetite is down a little. other than that she has been okay." pt has hx of thalassemia minor. a and o to self (baseline mental status, hx dementia). becerril. perrl. breathing even and unlabored. lungs cta. cap refill brisk. skin w/d/i. s1 s2 rrr. + and = peripheral pulses. no le edema. abdomen soft nontender nondistended. will continue to monitor.

## 2017-07-15 NOTE — H&P ADULT - HISTORY OF PRESENT ILLNESS
84 yo F w/hx dementia, CAD stents (2 years ago), Dm2, COPD, thalassemia presents with anemia after having outpatient labs done.     patient has dementia, poor historian and denies any acute complaints.    d/w son over the phone, patient has been more lethargic recently. no complaints of post prandial epigastric pain, no melena/bloody stool. + anorexia. no NSAID use. remote hx of egd/colonoscopy (unsure of results).  minimal ambulation at home due to OA

## 2017-07-15 NOTE — H&P ADULT - PROBLEM SELECTOR PLAN 5
d/w son leobardo) [phone] re plan of care  no definite dnr/dni---states "depends on situation/outlook".

## 2017-07-15 NOTE — ED ADULT NURSE REASSESSMENT NOTE - COMFORT CARE
plan of care explained/side rails up/repositioned
plan of care explained/repositioned/po fluids offered/side rails up

## 2017-07-15 NOTE — ED ADULT NURSE NOTE - CHPI ED SYMPTOMS NEG
no hematuria/no dysuria/no chills/no fever/no diarrhea/no blood in stool/no nausea/no burning urination/no abdominal distension/no vomiting

## 2017-07-15 NOTE — ED PROVIDER NOTE - MUSCULOSKELETAL, MLM
Spine appears normal, range of motion is not limited, no muscle or joint tenderness, Ext no edema or cyanosis

## 2017-07-15 NOTE — ED ADULT NURSE REASSESSMENT NOTE - NS ED NURSE REASSESS COMMENT FT1
Assumed pt. care from David RODRIGUEZ. Pt. is a/o x3 person, place, , but is confused as pt. believes the president is Sina. Pt. does speak Assumed pt. care from David RN. Pt. is a/o x3 person, place, , but is confused as pt. believes the president is Sina. Pt. does speak clearly but at times mumbles and is unable to recall personal information. Pt. pulses are regular and equal bi-laterally, skin is warm and dry. Pt. lung sounds are clear and diminished in all lobes. Pt. denies any SOB or pain at this time. Pt. has minor bruising on left arm and 20g in right forearm that is patent with PRBC's on alaris running at 125 ml/hr. Pt. is dressed in gown, socks, and diaper. Pt. verbalizes ability to ambulate with wheelchair assistance at home and previous RN states wheelchair assistance used, will reassess to see pt.s ability to ambulate. Will continue to monitor pt.

## 2017-07-16 LAB
ANION GAP SERPL CALC-SCNC: 10 MMOL/L — SIGNIFICANT CHANGE UP (ref 5–17)
BUN SERPL-MCNC: 8 MG/DL — SIGNIFICANT CHANGE UP (ref 8–20)
CALCIUM SERPL-MCNC: 8.5 MG/DL — LOW (ref 8.6–10.2)
CHLORIDE SERPL-SCNC: 103 MMOL/L — SIGNIFICANT CHANGE UP (ref 98–107)
CO2 SERPL-SCNC: 25 MMOL/L — SIGNIFICANT CHANGE UP (ref 22–29)
CREAT SERPL-MCNC: 0.52 MG/DL — SIGNIFICANT CHANGE UP (ref 0.5–1.3)
GLUCOSE SERPL-MCNC: 88 MG/DL — SIGNIFICANT CHANGE UP (ref 70–115)
HBA1C BLD-MCNC: 5.1 % — SIGNIFICANT CHANGE UP (ref 4–5.6)
HCT VFR BLD CALC: 26.8 % — LOW (ref 37–47)
HGB BLD-MCNC: 8.7 G/DL — LOW (ref 12–16)
MCHC RBC-ENTMCNC: 21.6 PG — LOW (ref 27–31)
MCHC RBC-ENTMCNC: 32.5 G/DL — SIGNIFICANT CHANGE UP (ref 32–36)
MCV RBC AUTO: 66.7 FL — LOW (ref 81–99)
PLATELET # BLD AUTO: 449 K/UL — HIGH (ref 150–400)
POTASSIUM SERPL-MCNC: 3.9 MMOL/L — SIGNIFICANT CHANGE UP (ref 3.5–5.3)
POTASSIUM SERPL-SCNC: 3.9 MMOL/L — SIGNIFICANT CHANGE UP (ref 3.5–5.3)
RBC # BLD: 4.02 M/UL — LOW (ref 4.4–5.2)
RBC # FLD: 18.7 % — HIGH (ref 11–15.6)
SODIUM SERPL-SCNC: 138 MMOL/L — SIGNIFICANT CHANGE UP (ref 135–145)
WBC # BLD: 6 K/UL — SIGNIFICANT CHANGE UP (ref 4.8–10.8)
WBC # FLD AUTO: 6 K/UL — SIGNIFICANT CHANGE UP (ref 4.8–10.8)

## 2017-07-16 PROCEDURE — 93010 ELECTROCARDIOGRAM REPORT: CPT

## 2017-07-16 PROCEDURE — 99233 SBSQ HOSP IP/OBS HIGH 50: CPT

## 2017-07-16 RX ORDER — PANTOPRAZOLE SODIUM 20 MG/1
40 TABLET, DELAYED RELEASE ORAL
Qty: 0 | Refills: 0 | Status: DISCONTINUED | OUTPATIENT
Start: 2017-07-16 | End: 2017-07-17

## 2017-07-16 RX ORDER — BACITRACIN ZINC 500 UNIT/G
1 OINTMENT IN PACKET (EA) TOPICAL DAILY
Qty: 0 | Refills: 0 | Status: DISCONTINUED | OUTPATIENT
Start: 2017-07-16 | End: 2017-07-17

## 2017-07-16 RX ADMIN — AMLODIPINE BESYLATE 5 MILLIGRAM(S): 2.5 TABLET ORAL at 05:01

## 2017-07-16 RX ADMIN — ATORVASTATIN CALCIUM 10 MILLIGRAM(S): 80 TABLET, FILM COATED ORAL at 21:36

## 2017-07-16 RX ADMIN — Medication 20 MILLIGRAM(S): at 11:45

## 2017-07-16 RX ADMIN — Medication 1 APPLICATION(S): at 21:36

## 2017-07-16 RX ADMIN — BUDESONIDE AND FORMOTEROL FUMARATE DIHYDRATE 2 PUFF(S): 160; 4.5 AEROSOL RESPIRATORY (INHALATION) at 09:35

## 2017-07-16 RX ADMIN — DONEPEZIL HYDROCHLORIDE 5 MILLIGRAM(S): 10 TABLET, FILM COATED ORAL at 21:36

## 2017-07-16 RX ADMIN — IRON SUCROSE 110 MILLIGRAM(S): 20 INJECTION, SOLUTION INTRAVENOUS at 18:24

## 2017-07-16 RX ADMIN — BUDESONIDE AND FORMOTEROL FUMARATE DIHYDRATE 2 PUFF(S): 160; 4.5 AEROSOL RESPIRATORY (INHALATION) at 20:38

## 2017-07-16 RX ADMIN — PANTOPRAZOLE SODIUM 40 MILLIGRAM(S): 20 TABLET, DELAYED RELEASE ORAL at 05:01

## 2017-07-16 NOTE — PROGRESS NOTE ADULT - SUBJECTIVE AND OBJECTIVE BOX
seen for symptomatic anemia    confused, no acute complaints.  ROS negative     MEDICATIONS  (STANDING):  iron sucrose IVPB 200 milliGRAM(s) IV Intermittent every 24 hours  atorvastatin 10 milliGRAM(s) Oral at bedtime  amLODIPine   Tablet 5 milliGRAM(s) Oral daily  buDESOnide 160 MICROgram(s)/formoterol 4.5 MICROgram(s) Inhaler 2 Puff(s) Inhalation two times a day  donepezil 5 milliGRAM(s) Oral at bedtime  FLUoxetine 20 milliGRAM(s) Oral daily  pantoprazole    Tablet 40 milliGRAM(s) Oral before breakfast    MEDICATIONS  (PRN):      Allergies    penicillin (Rash)    Vital Signs Last 24 Hrs  T(C): 36.8 (16 Jul 2017 07:28), Max: 37.1 (15 Jul 2017 19:10)  T(F): 98.3 (16 Jul 2017 07:28), Max: 98.8 (15 Jul 2017 19:10)  HR: 74 (16 Jul 2017 07:28) (73 - 82)  BP: 135/61 (16 Jul 2017 07:28) (115/54 - 167/79)  BP(mean): --  RR: 18 (16 Jul 2017 07:28) (16 - 18)  SpO2: 96% (16 Jul 2017 03:15) (95% - 98%)    PHYSICAL EXAM:    GENERAL: NAD, mildly confused   CHEST/LUNG: ctab  HEART: Regular rate and rhythm; S1 S2; no murmurs noted  ABDOMEN: soft +BS       LABS:                        8.7    6.0   )-----------( 449      ( 16 Jul 2017 07:41 )             26.8     07-16    138  |  103  |  8.0  ----------------------------<  88  3.9   |  25.0  |  0.52    Ca    8.5<L>      16 Jul 2017 07:41    TPro  7.0  /  Alb  3.9  /  TBili  0.4  /  DBili  x   /  AST  19  /  ALT  13  /  AlkPhos  97  07-15    PT/INR - ( 15 Jul 2017 16:51 )   PT: 11.5 sec;   INR: 1.04 ratio         PTT - ( 15 Jul 2017 16:51 )  PTT:26.9 sec      CAPILLARY BLOOD GLUCOSE            RADIOLOGY & ADDITIONAL TESTS:

## 2017-07-16 NOTE — PROGRESS NOTE ADULT - PROBLEM SELECTOR PLAN 5
d/w son leobardo) [phone] re plan of care  no definite dnr/dni---states "depends on situation/outlook".

## 2017-07-17 VITALS
OXYGEN SATURATION: 97 % | RESPIRATION RATE: 18 BRPM | DIASTOLIC BLOOD PRESSURE: 52 MMHG | HEART RATE: 77 BPM | SYSTOLIC BLOOD PRESSURE: 140 MMHG | TEMPERATURE: 99 F

## 2017-07-17 LAB
HCT VFR BLD CALC: 28.5 % — LOW (ref 37–47)
HGB BLD-MCNC: 9.3 G/DL — LOW (ref 12–16)
MCHC RBC-ENTMCNC: 21.7 PG — LOW (ref 27–31)
MCHC RBC-ENTMCNC: 32.6 G/DL — SIGNIFICANT CHANGE UP (ref 32–36)
MCV RBC AUTO: 66.6 FL — LOW (ref 81–99)
PLATELET # BLD AUTO: 475 K/UL — HIGH (ref 150–400)
RBC # BLD: 4.28 M/UL — LOW (ref 4.4–5.2)
RBC # FLD: 19.5 % — HIGH (ref 11–15.6)
WBC # BLD: 6.4 K/UL — SIGNIFICANT CHANGE UP (ref 4.8–10.8)
WBC # FLD AUTO: 6.4 K/UL — SIGNIFICANT CHANGE UP (ref 4.8–10.8)

## 2017-07-17 PROCEDURE — 36415 COLL VENOUS BLD VENIPUNCTURE: CPT

## 2017-07-17 PROCEDURE — 85610 PROTHROMBIN TIME: CPT

## 2017-07-17 PROCEDURE — 86900 BLOOD TYPING SEROLOGIC ABO: CPT

## 2017-07-17 PROCEDURE — 99285 EMERGENCY DEPT VISIT HI MDM: CPT | Mod: 25

## 2017-07-17 PROCEDURE — P9016: CPT

## 2017-07-17 PROCEDURE — 36430 TRANSFUSION BLD/BLD COMPNT: CPT

## 2017-07-17 PROCEDURE — 97163 PT EVAL HIGH COMPLEX 45 MIN: CPT

## 2017-07-17 PROCEDURE — 96374 THER/PROPH/DIAG INJ IV PUSH: CPT

## 2017-07-17 PROCEDURE — 83036 HEMOGLOBIN GLYCOSYLATED A1C: CPT

## 2017-07-17 PROCEDURE — 86920 COMPATIBILITY TEST SPIN: CPT

## 2017-07-17 PROCEDURE — 71045 X-RAY EXAM CHEST 1 VIEW: CPT

## 2017-07-17 PROCEDURE — 86850 RBC ANTIBODY SCREEN: CPT

## 2017-07-17 PROCEDURE — 94640 AIRWAY INHALATION TREATMENT: CPT

## 2017-07-17 PROCEDURE — 96375 TX/PRO/DX INJ NEW DRUG ADDON: CPT

## 2017-07-17 PROCEDURE — 82272 OCCULT BLD FECES 1-3 TESTS: CPT

## 2017-07-17 PROCEDURE — 86901 BLOOD TYPING SEROLOGIC RH(D): CPT

## 2017-07-17 PROCEDURE — 80048 BASIC METABOLIC PNL TOTAL CA: CPT

## 2017-07-17 PROCEDURE — 85027 COMPLETE CBC AUTOMATED: CPT

## 2017-07-17 PROCEDURE — 99239 HOSP IP/OBS DSCHRG MGMT >30: CPT

## 2017-07-17 PROCEDURE — 93005 ELECTROCARDIOGRAM TRACING: CPT

## 2017-07-17 PROCEDURE — 85730 THROMBOPLASTIN TIME PARTIAL: CPT

## 2017-07-17 PROCEDURE — 80053 COMPREHEN METABOLIC PANEL: CPT

## 2017-07-17 RX ORDER — OMEPRAZOLE 10 MG/1
1 CAPSULE, DELAYED RELEASE ORAL
Qty: 30 | Refills: 0 | OUTPATIENT
Start: 2017-07-17 | End: 2017-08-16

## 2017-07-17 RX ORDER — METFORMIN HYDROCHLORIDE 850 MG/1
1 TABLET ORAL
Qty: 0 | Refills: 0 | COMMUNITY

## 2017-07-17 RX ORDER — FERROUS SULFATE 325(65) MG
1 TABLET ORAL
Qty: 30 | Refills: 0 | OUTPATIENT
Start: 2017-07-17 | End: 2017-08-16

## 2017-07-17 RX ORDER — ASPIRIN/CALCIUM CARB/MAGNESIUM 324 MG
81 TABLET ORAL DAILY
Qty: 0 | Refills: 0 | Status: DISCONTINUED | OUTPATIENT
Start: 2017-07-17 | End: 2017-07-17

## 2017-07-17 RX ADMIN — AMLODIPINE BESYLATE 5 MILLIGRAM(S): 2.5 TABLET ORAL at 05:33

## 2017-07-17 RX ADMIN — BUDESONIDE AND FORMOTEROL FUMARATE DIHYDRATE 2 PUFF(S): 160; 4.5 AEROSOL RESPIRATORY (INHALATION) at 08:31

## 2017-07-17 RX ADMIN — IRON SUCROSE 110 MILLIGRAM(S): 20 INJECTION, SOLUTION INTRAVENOUS at 16:31

## 2017-07-17 RX ADMIN — Medication 20 MILLIGRAM(S): at 11:20

## 2017-07-17 RX ADMIN — PANTOPRAZOLE SODIUM 40 MILLIGRAM(S): 20 TABLET, DELAYED RELEASE ORAL at 05:33

## 2017-07-17 RX ADMIN — Medication 81 MILLIGRAM(S): at 13:27

## 2017-07-17 RX ADMIN — Medication 1 APPLICATION(S): at 11:20

## 2017-07-17 NOTE — PHYSICAL THERAPY INITIAL EVALUATION ADULT - IMPAIRMENTS FOUND, PT EVAL
gait, locomotion, and balance/arousal, attention, and cognition/poor safety awareness/muscle strength/ROM

## 2017-07-17 NOTE — DISCHARGE NOTE ADULT - PATIENT PORTAL LINK FT
“You can access the [facility] Patient Portal, offered by [facility], by registering with the following website: [email or web address]”

## 2017-07-17 NOTE — DISCHARGE NOTE ADULT - PLAN OF CARE
resolved after transfusion. no evidence of bleed. repeat blood work in 1 week with Dr Nascimento stable home medications. home medications.  stop plavix as stents are >1 year old. hemoglobin a1c 5.1 and fingersticks within normal limits.  please stop metformin and liberalize diet.

## 2017-07-17 NOTE — DISCHARGE NOTE ADULT - CARE PLAN
Principal Discharge DX:	Symptomatic anemia  Goal:	resolved after transfusion. no evidence of bleed.  Instructions for follow-up, activity and diet:	repeat blood work in 1 week with Dr Nascimento  Secondary Diagnosis:	Thalassemia minor  Instructions for follow-up, activity and diet:	stable  Secondary Diagnosis:	Dementia without behavioral disturbance, unspecified dementia type  Instructions for follow-up, activity and diet:	home medications.  Secondary Diagnosis:	Coronary artery disease involving native coronary artery of native heart without angina pectoris  Instructions for follow-up, activity and diet:	home medications.  stop plavix as stents are >1 year old.  Secondary Diagnosis:	COPD (chronic obstructive pulmonary disease)  Instructions for follow-up, activity and diet:	home medications.  Secondary Diagnosis:	Type 2 diabetes mellitus without complication, without long-term current use of insulin  Instructions for follow-up, activity and diet:	hemoglobin a1c 5.1 and fingersticks within normal limits.  please stop metformin and liberalize diet.

## 2017-07-17 NOTE — DISCHARGE NOTE ADULT - CARE PROVIDER_API CALL
Clint Nascimento (MD), [facility]  [address]  [place] [address]  Phone: [phone]  Fax: [phone]

## 2017-07-17 NOTE — DISCHARGE NOTE ADULT - MEDICATION SUMMARY - MEDICATIONS TO STOP TAKING
I will STOP taking the medications listed below when I get home from the hospital:    clopidogrel 75 mg oral tablet  -- 1 tab(s) by mouth once a day    metoprolol 25 mg oral tablet, extended release  -- 1 tab(s) by mouth once a day    ciprofloxacin 500 mg oral tablet  -- 1 tab(s) by mouth every 12 hours    metFORMIN 500 mg oral tablet  -- 1 tab(s) by mouth once a day

## 2017-07-17 NOTE — PHYSICAL THERAPY INITIAL EVALUATION ADULT - GENERAL OBSERVATIONS, REHAB EVAL
pt received hooklying in bed, no c/o pain at rest, pt difficult to understand at times but agreeable to PT

## 2017-07-17 NOTE — PHYSICAL THERAPY INITIAL EVALUATION ADULT - LEVEL OF INDEPENDENCE: BED TO CHAIR, REHAB EVAL
Unsafe 2*2 decreased cognitive status, pt unable to stand and ROM limitations in BLEs/unable to perform

## 2017-07-17 NOTE — DISCHARGE NOTE ADULT - MEDICATION SUMMARY - MEDICATIONS TO TAKE
I will START or STAY ON the medications listed below when I get home from the hospital:    aspirin 81 mg oral tablet  -- 1 tab(s) by mouth once a day  -- Indication: For CAD (coronary artery disease)    FLUoxetine 20 mg oral capsule  -- 1 cap(s) by mouth once a day  -- Indication: For Dementia without behavioral disturbance, unspecified dementia type    atorvastatin 10 mg oral tablet  -- 1 tab(s) by mouth once a day (at bedtime)  -- Indication: For CAD (coronary artery disease)    Dulera 100 mcg-5 mcg/inh inhalation aerosol  -- 2 puff(s) inhaled 2 times a day  -- Indication: For COPD (chronic obstructive pulmonary disease)    albuterol 2.5 mg/3 mL (0.083%) inhalation solution  -- 3 milliliter(s) inhaled every 6 hours, As Needed  -- Indication: For COPD (chronic obstructive pulmonary disease)    amLODIPine  -- 5 milligram(s) by mouth once a day  -- Indication: For HTN (hypertension)    donepezil 5 mg oral tablet  -- 1 tab(s) by mouth once a day (at bedtime)  -- Indication: For Dementia without behavioral disturbance, unspecified dementia type    ferrous sulfate 325 mg (65 mg elemental iron) oral tablet  -- 1 tab(s) by mouth once a day  -- Check with your doctor before becoming pregnant.  Do not chew, break, or crush.  May discolor urine or feces.    -- Indication: For Anemia    PriLOSEC 10 mg oral delayed release capsule  -- 1 cap(s) by mouth once a day  -- It is very important that you take or use this exactly as directed.  Do not skip doses or discontinue unless directed by your doctor.  Obtain medical advice before taking any non-prescription drugs as some may affect the action of this medication.  Swallow whole.  Do not crush.    -- Indication: For reflux

## 2017-07-17 NOTE — DISCHARGE NOTE ADULT - HOSPITAL COURSE
84 yo F w/hx dementia, CAD stents (2 years ago), Dm2, COPD, thalassemia presents with symptomatic anemia.    Patient is confused, unable to provide information.   Received 2 units blood and venofer with improvement in hemoglobin.  Iron panel and other tests not completed by lab prior to transfusion.  negative occult blood.  Discussed with son Tu over the phone, opt for conservative management. advised to stop plavix given old stents.  seen by physical therapy and patient debilitated which is at baseline per son.   discharge home. d/c planning 35 min (spoke to son in detail over the phone). patient has home aides and home assistive devices.  VSS afebrile. no acute complaints but confused.  alert and wake, responsive, rrr s1s2 CTAB soft abdomen with + bowel sounds, no LE edema.

## 2017-07-17 NOTE — PHYSICAL THERAPY INITIAL EVALUATION ADULT - ADDITIONAL COMMENTS
Pt is a poor historian, states she lives in a house with her mother and father. As per H&P pt has a son but unable to find further information on living Environment and PLOF.

## 2017-07-17 NOTE — PHYSICAL THERAPY INITIAL EVALUATION ADULT - PERTINENT HX OF CURRENT PROBLEM, REHAB EVAL
84 yo F w/hx dementia, CAD stents (2 years ago), Dm2, COPD, thalassemia presents with anemia after having outpatient labs done. Pt is s/p 2 units PRBCs

## 2017-07-17 NOTE — DISCHARGE NOTE ADULT - SECONDARY DIAGNOSIS.
Thalassemia minor Dementia without behavioral disturbance, unspecified dementia type Coronary artery disease involving native coronary artery of native heart without angina pectoris COPD (chronic obstructive pulmonary disease) Type 2 diabetes mellitus without complication, without long-term current use of insulin

## 2017-07-18 PROBLEM — I10 ESSENTIAL (PRIMARY) HYPERTENSION: Chronic | Status: ACTIVE | Noted: 2017-07-15

## 2017-07-18 PROBLEM — I25.10 ATHEROSCLEROTIC HEART DISEASE OF NATIVE CORONARY ARTERY WITHOUT ANGINA PECTORIS: Chronic | Status: ACTIVE | Noted: 2017-07-15

## 2017-07-24 ENCOUNTER — RESULT CHARGE (OUTPATIENT)
Age: 82
End: 2017-07-24

## 2017-07-24 ENCOUNTER — APPOINTMENT (OUTPATIENT)
Dept: FAMILY MEDICINE | Facility: CLINIC | Age: 82
End: 2017-07-24

## 2017-07-24 VITALS
TEMPERATURE: 98.2 F | DIASTOLIC BLOOD PRESSURE: 62 MMHG | HEART RATE: 62 BPM | RESPIRATION RATE: 14 BRPM | WEIGHT: 108 LBS | BODY MASS INDEX: 17.36 KG/M2 | HEIGHT: 66 IN | OXYGEN SATURATION: 96 % | SYSTOLIC BLOOD PRESSURE: 118 MMHG

## 2017-07-24 DIAGNOSIS — T14.8 OTHER INJURY OF UNSPECIFIED BODY REGION: ICD-10-CM

## 2017-07-24 DIAGNOSIS — M79.89 PAIN IN RIGHT ARM: ICD-10-CM

## 2017-07-24 DIAGNOSIS — M79.601 PAIN IN RIGHT ARM: ICD-10-CM

## 2017-07-24 DIAGNOSIS — S81.802D UNSPECIFIED OPEN WOUND, LEFT LOWER LEG, SUBSEQUENT ENCOUNTER: ICD-10-CM

## 2017-07-24 DIAGNOSIS — S02.2XXD FRACTURE OF NASAL BONES, SUBSEQUENT ENCOUNTER FOR FRACTURE WITH ROUTINE HEALING: ICD-10-CM

## 2017-07-24 DIAGNOSIS — Z87.2 PERSONAL HISTORY OF DISEASES OF THE SKIN AND SUBCUTANEOUS TISSUE: ICD-10-CM

## 2017-07-24 DIAGNOSIS — Z87.898 PERSONAL HISTORY OF OTHER SPECIFIED CONDITIONS: ICD-10-CM

## 2017-07-24 DIAGNOSIS — I25.10 ATHEROSCLEROTIC HEART DISEASE OF NATIVE CORONARY ARTERY W/OUT ANGINA PECTORIS: ICD-10-CM

## 2017-07-24 DIAGNOSIS — L08.9 OTHER INJURY OF UNSPECIFIED BODY REGION: ICD-10-CM

## 2017-07-24 DIAGNOSIS — Z87.81 PERSONAL HISTORY OF (HEALED) TRAUMATIC FRACTURE: ICD-10-CM

## 2017-07-24 DIAGNOSIS — Z95.5 PRESENCE OF CORONARY ANGIOPLASTY IMPLANT AND GRAFT: ICD-10-CM

## 2017-07-24 DIAGNOSIS — Z91.81 HISTORY OF FALLING: ICD-10-CM

## 2017-07-24 DIAGNOSIS — Z96.641 PRESENCE OF RIGHT ARTIFICIAL HIP JOINT: ICD-10-CM

## 2017-07-24 LAB
BUN SERPL-MCNC: 11
CA-I SERPL-SCNC: 1.2
CHLORIDE SERPL-SCNC: 99
CO2 BLDA-SCNC: 25
CREAT SERPL-MCNC: 0.6
GLUCOSE SERPL-MCNC: 85
HEMOGLOBIN: 36
NT-PROBNP SERPL-MCNC: 12.2
POTASSIUM SERPL-SCNC: 3.8
SODIUM SERPL-SCNC: 138

## 2017-07-24 RX ORDER — AMOXICILLIN AND CLAVULANATE POTASSIUM 500; 125 MG/1; MG/1
500-125 TABLET, FILM COATED ORAL
Qty: 14 | Refills: 0 | Status: DISCONTINUED | COMMUNITY
Start: 2017-05-19 | End: 2017-07-24

## 2017-07-24 RX ORDER — CHLORHEXIDINE GLUCONATE 4 %
325 (65 FE) LIQUID (ML) TOPICAL TWICE DAILY
Qty: 60 | Refills: 2 | Status: DISCONTINUED | COMMUNITY
Start: 2017-05-12 | End: 2017-07-24

## 2017-07-26 LAB — HBA1C MFR BLD HPLC: 2.5

## 2017-08-11 ENCOUNTER — RX RENEWAL (OUTPATIENT)
Age: 82
End: 2017-08-11

## 2017-08-11 DIAGNOSIS — K21.9 GASTRO-ESOPHAGEAL REFLUX DISEASE W/OUT ESOPHAGITIS: ICD-10-CM

## 2017-08-11 RX ORDER — CHLORHEXIDINE GLUCONATE 4 %
325 (65 FE) LIQUID (ML) TOPICAL TWICE DAILY
Qty: 180 | Refills: 0 | Status: ACTIVE | COMMUNITY
Start: 1900-01-01 | End: 1900-01-01

## 2017-08-21 ENCOUNTER — LABORATORY RESULT (OUTPATIENT)
Age: 82
End: 2017-08-21

## 2017-08-21 ENCOUNTER — APPOINTMENT (OUTPATIENT)
Dept: FAMILY MEDICINE | Facility: CLINIC | Age: 82
End: 2017-08-21
Payer: MEDICARE

## 2017-08-21 VITALS
HEART RATE: 80 BPM | BODY MASS INDEX: 17.36 KG/M2 | DIASTOLIC BLOOD PRESSURE: 66 MMHG | HEIGHT: 66 IN | OXYGEN SATURATION: 98 % | TEMPERATURE: 97.5 F | SYSTOLIC BLOOD PRESSURE: 116 MMHG | WEIGHT: 108 LBS | RESPIRATION RATE: 12 BRPM

## 2017-08-21 DIAGNOSIS — E78.00 PURE HYPERCHOLESTEROLEMIA, UNSPECIFIED: ICD-10-CM

## 2017-08-21 DIAGNOSIS — F32.9 MAJOR DEPRESSIVE DISORDER, SINGLE EPISODE, UNSPECIFIED: ICD-10-CM

## 2017-08-21 DIAGNOSIS — M17.0 BILATERAL PRIMARY OSTEOARTHRITIS OF KNEE: ICD-10-CM

## 2017-08-21 DIAGNOSIS — D64.9 ANEMIA, UNSPECIFIED: ICD-10-CM

## 2017-08-21 DIAGNOSIS — F03.90 UNSPECIFIED DEMENTIA W/OUT BEHAVIORAL DISTURBANCE: ICD-10-CM

## 2017-08-21 DIAGNOSIS — J44.9 CHRONIC OBSTRUCTIVE PULMONARY DISEASE, UNSPECIFIED: ICD-10-CM

## 2017-08-21 DIAGNOSIS — I10 ESSENTIAL (PRIMARY) HYPERTENSION: ICD-10-CM

## 2017-08-21 DIAGNOSIS — E11.9 TYPE 2 DIABETES MELLITUS W/OUT COMPLICATIONS: ICD-10-CM

## 2017-08-21 DIAGNOSIS — E78.5 HYPERLIPIDEMIA, UNSPECIFIED: ICD-10-CM

## 2017-08-21 PROCEDURE — 36415 COLL VENOUS BLD VENIPUNCTURE: CPT

## 2017-08-21 PROCEDURE — 99215 OFFICE O/P EST HI 40 MIN: CPT | Mod: 25

## 2017-08-21 RX ORDER — OMEPRAZOLE MAGNESIUM 10 MG/1
10 GRANULE, DELAYED RELEASE ORAL DAILY
Qty: 3 | Refills: 0 | Status: DISCONTINUED | COMMUNITY
End: 2017-08-21

## 2017-08-21 RX ORDER — DONEPEZIL HYDROCHLORIDE 10 MG/1
10 TABLET ORAL
Qty: 30 | Refills: 2 | Status: ACTIVE | COMMUNITY
Start: 2017-07-13 | End: 1900-01-01

## 2017-08-22 ENCOUNTER — RX RENEWAL (OUTPATIENT)
Age: 82
End: 2017-08-22

## 2017-08-22 LAB
ALBUMIN SERPL ELPH-MCNC: 3.8 G/DL
ALP BLD-CCNC: 113 U/L
ALT SERPL-CCNC: 11 U/L
ANION GAP SERPL CALC-SCNC: 16 MMOL/L
AST SERPL-CCNC: 15 U/L
BASOPHILS # BLD AUTO: 0.03 K/UL
BASOPHILS NFR BLD AUTO: 0.5 %
BILIRUB SERPL-MCNC: 0.4 MG/DL
BUN SERPL-MCNC: 12 MG/DL
CALCIUM SERPL-MCNC: 9.8 MG/DL
CHLORIDE SERPL-SCNC: 101 MMOL/L
CO2 SERPL-SCNC: 25 MMOL/L
CREAT SERPL-MCNC: 0.76 MG/DL
EOSINOPHIL # BLD AUTO: 0.08 K/UL
EOSINOPHIL NFR BLD AUTO: 1.3 %
GLUCOSE SERPL-MCNC: 81 MG/DL
HCT VFR BLD CALC: 32.6 %
HGB BLD-MCNC: 9.7 G/DL
IMM GRANULOCYTES NFR BLD AUTO: 0.5 %
LYMPHOCYTES # BLD AUTO: 0.81 K/UL
LYMPHOCYTES NFR BLD AUTO: 13.5 %
MAN DIFF?: NORMAL
MCHC RBC-ENTMCNC: 20.1 PG
MCHC RBC-ENTMCNC: 29.8 GM/DL
MCV RBC AUTO: 67.5 FL
MONOCYTES # BLD AUTO: 0.37 K/UL
MONOCYTES NFR BLD AUTO: 6.2 %
NEUTROPHILS # BLD AUTO: 4.66 K/UL
NEUTROPHILS NFR BLD AUTO: 78 %
PLATELET # BLD AUTO: 396 K/UL
POTASSIUM SERPL-SCNC: 4.6 MMOL/L
PROT SERPL-MCNC: 6.8 G/DL
RBC # BLD: 4.83 M/UL
RBC # FLD: 21.7 %
SODIUM SERPL-SCNC: 142 MMOL/L
WBC # FLD AUTO: 5.98 K/UL

## 2017-08-28 ENCOUNTER — RX RENEWAL (OUTPATIENT)
Age: 82
End: 2017-08-28

## 2017-09-09 ENCOUNTER — RX RENEWAL (OUTPATIENT)
Age: 82
End: 2017-09-09

## 2017-09-24 ENCOUNTER — INPATIENT (INPATIENT)
Facility: HOSPITAL | Age: 82
LOS: 1 days | Discharge: HOSPICE | DRG: 948 | End: 2017-09-26
Attending: INTERNAL MEDICINE | Admitting: HOSPITALIST
Payer: MEDICARE

## 2017-09-24 VITALS
RESPIRATION RATE: 16 BRPM | HEIGHT: 63 IN | HEART RATE: 82 BPM | WEIGHT: 130.07 LBS | SYSTOLIC BLOOD PRESSURE: 157 MMHG | DIASTOLIC BLOOD PRESSURE: 80 MMHG | OXYGEN SATURATION: 92 % | TEMPERATURE: 98 F

## 2017-09-24 DIAGNOSIS — R41.82 ALTERED MENTAL STATUS, UNSPECIFIED: ICD-10-CM

## 2017-09-24 LAB
ALBUMIN SERPL ELPH-MCNC: 3.9 G/DL — SIGNIFICANT CHANGE UP (ref 3.3–5.2)
ALP SERPL-CCNC: 119 U/L — SIGNIFICANT CHANGE UP (ref 40–120)
ALT FLD-CCNC: 16 U/L — SIGNIFICANT CHANGE UP
ANION GAP SERPL CALC-SCNC: 15 MMOL/L — SIGNIFICANT CHANGE UP (ref 5–17)
ANISOCYTOSIS BLD QL: SIGNIFICANT CHANGE UP
APPEARANCE UR: CLEAR — SIGNIFICANT CHANGE UP
APTT BLD: 25.6 SEC — LOW (ref 27.5–37.4)
AST SERPL-CCNC: 26 U/L — SIGNIFICANT CHANGE UP
BILIRUB SERPL-MCNC: 0.6 MG/DL — SIGNIFICANT CHANGE UP (ref 0.4–2)
BILIRUB UR-MCNC: NEGATIVE — SIGNIFICANT CHANGE UP
BLD GP AB SCN SERPL QL: SIGNIFICANT CHANGE UP
BUN SERPL-MCNC: 15 MG/DL — SIGNIFICANT CHANGE UP (ref 8–20)
CALCIUM SERPL-MCNC: 9.1 MG/DL — SIGNIFICANT CHANGE UP (ref 8.6–10.2)
CHLORIDE SERPL-SCNC: 97 MMOL/L — LOW (ref 98–107)
CK MB CFR SERPL CALC: 8.2 NG/ML — HIGH (ref 0–6.7)
CK SERPL-CCNC: 398 U/L — HIGH (ref 25–170)
CO2 SERPL-SCNC: 26 MMOL/L — SIGNIFICANT CHANGE UP (ref 22–29)
COLOR SPEC: YELLOW — SIGNIFICANT CHANGE UP
CREAT SERPL-MCNC: 0.58 MG/DL — SIGNIFICANT CHANGE UP (ref 0.5–1.3)
DIFF PNL FLD: ABNORMAL
ELLIPTOCYTES BLD QL SMEAR: SLIGHT — SIGNIFICANT CHANGE UP
GLUCOSE SERPL-MCNC: 192 MG/DL — HIGH (ref 70–115)
GLUCOSE UR QL: NEGATIVE MG/DL — SIGNIFICANT CHANGE UP
HCT VFR BLD CALC: 30.9 % — LOW (ref 37–47)
HGB BLD-MCNC: 9.9 G/DL — LOW (ref 12–16)
HYPOCHROMIA BLD QL: SLIGHT — SIGNIFICANT CHANGE UP
INR BLD: 1.05 RATIO — SIGNIFICANT CHANGE UP (ref 0.88–1.16)
KETONES UR-MCNC: ABNORMAL
LACTATE BLDV-MCNC: 1.1 MMOL/L — SIGNIFICANT CHANGE UP (ref 0.5–2)
LEUKOCYTE ESTERASE UR-ACNC: NEGATIVE — SIGNIFICANT CHANGE UP
LYMPHOCYTES # BLD AUTO: 3 % — LOW (ref 20–55)
MAGNESIUM SERPL-MCNC: 2 MG/DL — SIGNIFICANT CHANGE UP (ref 1.6–2.6)
MCHC RBC-ENTMCNC: 20.5 PG — LOW (ref 27–31)
MCHC RBC-ENTMCNC: 32 G/DL — SIGNIFICANT CHANGE UP (ref 32–36)
MCV RBC AUTO: 63.8 FL — LOW (ref 81–99)
MICROCYTES BLD QL: SIGNIFICANT CHANGE UP
MONOCYTES NFR BLD AUTO: 4 % — SIGNIFICANT CHANGE UP (ref 3–10)
NEUTROPHILS NFR BLD AUTO: 91 % — HIGH (ref 37–73)
NEUTS BAND # BLD: 2 % — SIGNIFICANT CHANGE UP (ref 0–8)
NITRITE UR-MCNC: NEGATIVE — SIGNIFICANT CHANGE UP
PH UR: 6.5 — SIGNIFICANT CHANGE UP (ref 5–8)
PHOSPHATE SERPL-MCNC: 3.3 MG/DL — SIGNIFICANT CHANGE UP (ref 2.4–4.7)
PLAT MORPH BLD: NORMAL — SIGNIFICANT CHANGE UP
PLATELET # BLD AUTO: 401 K/UL — HIGH (ref 150–400)
POIKILOCYTOSIS BLD QL AUTO: SLIGHT — SIGNIFICANT CHANGE UP
POTASSIUM SERPL-MCNC: 4.1 MMOL/L — SIGNIFICANT CHANGE UP (ref 3.5–5.3)
POTASSIUM SERPL-SCNC: 4.1 MMOL/L — SIGNIFICANT CHANGE UP (ref 3.5–5.3)
PROT SERPL-MCNC: 7.2 G/DL — SIGNIFICANT CHANGE UP (ref 6.6–8.7)
PROT UR-MCNC: 30 MG/DL
PROTHROM AB SERPL-ACNC: 11.6 SEC — SIGNIFICANT CHANGE UP (ref 9.8–12.7)
RBC # BLD: 4.84 M/UL — SIGNIFICANT CHANGE UP (ref 4.4–5.2)
RBC # FLD: 19.4 % — HIGH (ref 11–15.6)
RBC BLD AUTO: ABNORMAL
RBC CASTS # UR COMP ASSIST: ABNORMAL /HPF (ref 0–4)
SCHISTOCYTES BLD QL AUTO: SLIGHT — SIGNIFICANT CHANGE UP
SODIUM SERPL-SCNC: 138 MMOL/L — SIGNIFICANT CHANGE UP (ref 135–145)
SP GR SPEC: 1.01 — SIGNIFICANT CHANGE UP (ref 1.01–1.02)
TARGETS BLD QL SMEAR: SLIGHT — SIGNIFICANT CHANGE UP
TROPONIN T SERPL-MCNC: 0.01 NG/ML — SIGNIFICANT CHANGE UP (ref 0–0.06)
TYPE + AB SCN PNL BLD: SIGNIFICANT CHANGE UP
UROBILINOGEN FLD QL: 1 MG/DL
WBC # BLD: 12.7 K/UL — HIGH (ref 4.8–10.8)
WBC # FLD AUTO: 12.7 K/UL — HIGH (ref 4.8–10.8)
WBC UR QL: SIGNIFICANT CHANGE UP

## 2017-09-24 PROCEDURE — 99285 EMERGENCY DEPT VISIT HI MDM: CPT

## 2017-09-24 PROCEDURE — 70450 CT HEAD/BRAIN W/O DYE: CPT | Mod: 26

## 2017-09-24 PROCEDURE — 70486 CT MAXILLOFACIAL W/O DYE: CPT | Mod: 26

## 2017-09-24 PROCEDURE — 93010 ELECTROCARDIOGRAM REPORT: CPT

## 2017-09-24 PROCEDURE — 71010: CPT | Mod: 26

## 2017-09-24 RX ORDER — SODIUM CHLORIDE 9 MG/ML
500 INJECTION INTRAMUSCULAR; INTRAVENOUS; SUBCUTANEOUS ONCE
Qty: 0 | Refills: 0 | Status: COMPLETED | OUTPATIENT
Start: 2017-09-24 | End: 2017-09-24

## 2017-09-24 RX ADMIN — SODIUM CHLORIDE 1500 MILLILITER(S): 9 INJECTION INTRAMUSCULAR; INTRAVENOUS; SUBCUTANEOUS at 22:38

## 2017-09-24 NOTE — ED ADULT NURSE REASSESSMENT NOTE - NS ED NURSE REASSESS COMMENT FT1
Assumed patient care from JENNIFER Dinh at 1930, charting as noted. Received patient awake, alert but unable to answer questions. Received on a yellow gown. Family at bedside. Patient nsr on cm, vss. Left ac iv site without redness or swelling, patent. Noted with greenish bruises all over body, patient noted contracted on extremities, repositioned in bed for comfort and safety.  As per son Simone, patient is bed/wheelchair bound at home, has  been lethargic and with dementia, non-verbal as with speech problems. Son states patient has been picking on her skin thus the greenish bruises on body.  Patient supposedly to be seen by a general practitioner this wednesday as noted with coughing. They called 911 as patient has been deteriorating as per son. Patient is received with nonverbal indicator of pain not present. On o2 @ 2lp via nc. Plan of care and wait time explained to family, they verbalized understanding. Patient not in respiratory distress. To continue to monitor. Assumed patient care from JENNIFER Dinh at 1930, charting as noted. Received patient awake, alert but unable to answer questions. Received on a yellow gown. Family at bedside. Patient nsr on cm, vss. Left top hand iv site without redness or swelling, patent. Noted with greenish bruises all over body, patient noted contracted on extremities, repositioned in bed for comfort and safety.  As per son Simone, patient is bed/wheelchair bound at home, has  been lethargic and with dementia, non-verbal as with speech problems. Son states patient has been picking on her skin thus the greenish bruises on body.  Patient supposedly to be seen by a general practitioner this wednesday as noted with coughing. They called 911 as patient has been deteriorating as per son. Patient is received with nonverbal indicator of pain not present. On o2 @ 2lp via nc. Plan of care and wait time explained to family, they verbalized understanding. Patient not in respiratory distress. To continue to monitor.

## 2017-09-24 NOTE — ED PROVIDER NOTE - OBJECTIVE STATEMENT
85yo F hx of dementia, htn, copd p/w ams worsening over 4 days. as per son progressively getting worse over 1 month but now does not even respond in last 4 days + cough at home. Son denies f/c/n/v/cp/sob/palpitations/ /rash/headache/dizziness/abd.pain/d/c/dysuria/hematuria. notes that feels like cannot bring up sputum. not eating, and notes weakness to right hand x 4 days.

## 2017-09-24 NOTE — ED ADULT TRIAGE NOTE - CHIEF COMPLAINT QUOTE
Pt biba from home, as per EMS, family reported patient heavily declining x 2 weeks. Prior to decline, as per ems family stated patient would answer questions appropriately as has been aphasic since Thursday with right sided weakness. Bruising present to right side of face and right hand. As per EMS, family does all the transferring and patient has not fallen. Awake, and alert but does not follow commands or make eye contact when called.

## 2017-09-25 DIAGNOSIS — I25.10 ATHEROSCLEROTIC HEART DISEASE OF NATIVE CORONARY ARTERY WITHOUT ANGINA PECTORIS: ICD-10-CM

## 2017-09-25 DIAGNOSIS — R05 COUGH: ICD-10-CM

## 2017-09-25 DIAGNOSIS — Z29.9 ENCOUNTER FOR PROPHYLACTIC MEASURES, UNSPECIFIED: ICD-10-CM

## 2017-09-25 DIAGNOSIS — R41.82 ALTERED MENTAL STATUS, UNSPECIFIED: ICD-10-CM

## 2017-09-25 DIAGNOSIS — I10 ESSENTIAL (PRIMARY) HYPERTENSION: ICD-10-CM

## 2017-09-25 DIAGNOSIS — J44.9 CHRONIC OBSTRUCTIVE PULMONARY DISEASE, UNSPECIFIED: ICD-10-CM

## 2017-09-25 DIAGNOSIS — J98.4 OTHER DISORDERS OF LUNG: ICD-10-CM

## 2017-09-25 DIAGNOSIS — F03.90 UNSPECIFIED DEMENTIA, UNSPECIFIED SEVERITY, WITHOUT BEHAVIORAL DISTURBANCE, PSYCHOTIC DISTURBANCE, MOOD DISTURBANCE, AND ANXIETY: ICD-10-CM

## 2017-09-25 LAB
ACANTHOCYTES BLD QL SMEAR: SLIGHT — SIGNIFICANT CHANGE UP
ALBUMIN SERPL ELPH-MCNC: 3.5 G/DL — SIGNIFICANT CHANGE UP (ref 3.3–5.2)
ALP SERPL-CCNC: 93 U/L — SIGNIFICANT CHANGE UP (ref 40–120)
ALT FLD-CCNC: 15 U/L — SIGNIFICANT CHANGE UP
ANION GAP SERPL CALC-SCNC: 12 MMOL/L — SIGNIFICANT CHANGE UP (ref 5–17)
ANISOCYTOSIS BLD QL: SIGNIFICANT CHANGE UP
AST SERPL-CCNC: 32 U/L — HIGH
BASOPHILS # BLD AUTO: 0 K/UL — SIGNIFICANT CHANGE UP (ref 0–0.2)
BASOPHILS NFR BLD AUTO: 0.1 % — SIGNIFICANT CHANGE UP (ref 0–2)
BILIRUB SERPL-MCNC: 0.6 MG/DL — SIGNIFICANT CHANGE UP (ref 0.4–2)
BUN SERPL-MCNC: 14 MG/DL — SIGNIFICANT CHANGE UP (ref 8–20)
CALCIUM SERPL-MCNC: 8.8 MG/DL — SIGNIFICANT CHANGE UP (ref 8.6–10.2)
CHLORIDE SERPL-SCNC: 101 MMOL/L — SIGNIFICANT CHANGE UP (ref 98–107)
CO2 SERPL-SCNC: 25 MMOL/L — SIGNIFICANT CHANGE UP (ref 22–29)
CREAT SERPL-MCNC: 0.52 MG/DL — SIGNIFICANT CHANGE UP (ref 0.5–1.3)
CULTURE RESULTS: SIGNIFICANT CHANGE UP
DACRYOCYTES BLD QL SMEAR: SLIGHT — SIGNIFICANT CHANGE UP
ELLIPTOCYTES BLD QL SMEAR: SLIGHT — SIGNIFICANT CHANGE UP
EOSINOPHIL # BLD AUTO: 0 K/UL — SIGNIFICANT CHANGE UP (ref 0–0.5)
EOSINOPHIL NFR BLD AUTO: 0.1 % — SIGNIFICANT CHANGE UP (ref 0–6)
GLUCOSE SERPL-MCNC: 103 MG/DL — SIGNIFICANT CHANGE UP (ref 70–115)
HCT VFR BLD CALC: 27.9 % — LOW (ref 37–47)
HGB BLD-MCNC: 9 G/DL — LOW (ref 12–16)
HYPOCHROMIA BLD QL: SIGNIFICANT CHANGE UP
LYMPHOCYTES # BLD AUTO: 0.7 K/UL — LOW (ref 1–4.8)
LYMPHOCYTES # BLD AUTO: 6.7 % — LOW (ref 20–55)
MACROCYTES BLD QL: SLIGHT — SIGNIFICANT CHANGE UP
MCHC RBC-ENTMCNC: 20.7 PG — LOW (ref 27–31)
MCHC RBC-ENTMCNC: 32.3 G/DL — SIGNIFICANT CHANGE UP (ref 32–36)
MCV RBC AUTO: 64.3 FL — LOW (ref 81–99)
MICROCYTES BLD QL: SIGNIFICANT CHANGE UP
MONOCYTES # BLD AUTO: 0.7 K/UL — SIGNIFICANT CHANGE UP (ref 0–0.8)
MONOCYTES NFR BLD AUTO: 6.7 % — SIGNIFICANT CHANGE UP (ref 3–10)
NEUTROPHILS # BLD AUTO: 8.8 K/UL — HIGH (ref 1.8–8)
NEUTROPHILS NFR BLD AUTO: 86.1 % — HIGH (ref 37–73)
OVALOCYTES BLD QL SMEAR: SLIGHT — SIGNIFICANT CHANGE UP
PLAT MORPH BLD: NORMAL — SIGNIFICANT CHANGE UP
PLATELET # BLD AUTO: 364 K/UL — SIGNIFICANT CHANGE UP (ref 150–400)
POIKILOCYTOSIS BLD QL AUTO: SLIGHT — SIGNIFICANT CHANGE UP
POLYCHROMASIA BLD QL SMEAR: SLIGHT — SIGNIFICANT CHANGE UP
POTASSIUM SERPL-MCNC: 3.7 MMOL/L — SIGNIFICANT CHANGE UP (ref 3.5–5.3)
POTASSIUM SERPL-SCNC: 3.7 MMOL/L — SIGNIFICANT CHANGE UP (ref 3.5–5.3)
PROCALCITONIN SERPL-MCNC: 0.07 NG/ML — HIGH (ref 0–0.04)
PROT SERPL-MCNC: 6.1 G/DL — LOW (ref 6.6–8.7)
RBC # BLD: 4.34 M/UL — LOW (ref 4.4–5.2)
RBC # FLD: 19.1 % — HIGH (ref 11–15.6)
RBC BLD AUTO: ABNORMAL
RPR SERPL-ACNC: SIGNIFICANT CHANGE UP
SCHISTOCYTES BLD QL AUTO: SLIGHT — SIGNIFICANT CHANGE UP
SODIUM SERPL-SCNC: 138 MMOL/L — SIGNIFICANT CHANGE UP (ref 135–145)
SPECIMEN SOURCE: SIGNIFICANT CHANGE UP
TARGETS BLD QL SMEAR: SLIGHT — SIGNIFICANT CHANGE UP
TSH SERPL-MCNC: 1.87 UIU/ML — SIGNIFICANT CHANGE UP (ref 0.27–4.2)
VIT B12 SERPL-MCNC: 270 PG/ML — SIGNIFICANT CHANGE UP (ref 180–914)
WBC # BLD: 10.3 K/UL — SIGNIFICANT CHANGE UP (ref 4.8–10.8)
WBC # FLD AUTO: 10.3 K/UL — SIGNIFICANT CHANGE UP (ref 4.8–10.8)

## 2017-09-25 PROCEDURE — 71250 CT THORAX DX C-: CPT | Mod: 26

## 2017-09-25 PROCEDURE — 99233 SBSQ HOSP IP/OBS HIGH 50: CPT

## 2017-09-25 RX ORDER — ALBUTEROL 90 UG/1
2.5 AEROSOL, METERED ORAL EVERY 6 HOURS
Qty: 0 | Refills: 0 | Status: DISCONTINUED | OUTPATIENT
Start: 2017-09-25 | End: 2017-09-26

## 2017-09-25 RX ORDER — SODIUM CHLORIDE 9 MG/ML
1000 INJECTION, SOLUTION INTRAVENOUS
Qty: 0 | Refills: 0 | Status: COMPLETED | OUTPATIENT
Start: 2017-09-25 | End: 2017-09-26

## 2017-09-25 RX ORDER — HEPARIN SODIUM 5000 [USP'U]/ML
5000 INJECTION INTRAVENOUS; SUBCUTANEOUS EVERY 12 HOURS
Qty: 0 | Refills: 0 | Status: DISCONTINUED | OUTPATIENT
Start: 2017-09-25 | End: 2017-09-26

## 2017-09-25 RX ORDER — ASPIRIN/CALCIUM CARB/MAGNESIUM 324 MG
300 TABLET ORAL DAILY
Qty: 0 | Refills: 0 | Status: DISCONTINUED | OUTPATIENT
Start: 2017-09-26 | End: 2017-09-26

## 2017-09-25 RX ADMIN — HEPARIN SODIUM 5000 UNIT(S): 5000 INJECTION INTRAVENOUS; SUBCUTANEOUS at 17:25

## 2017-09-25 RX ADMIN — ALBUTEROL 2.5 MILLIGRAM(S): 90 AEROSOL, METERED ORAL at 16:45

## 2017-09-25 RX ADMIN — ALBUTEROL 2.5 MILLIGRAM(S): 90 AEROSOL, METERED ORAL at 01:26

## 2017-09-25 RX ADMIN — HEPARIN SODIUM 5000 UNIT(S): 5000 INJECTION INTRAVENOUS; SUBCUTANEOUS at 05:47

## 2017-09-25 RX ADMIN — ALBUTEROL 2.5 MILLIGRAM(S): 90 AEROSOL, METERED ORAL at 08:12

## 2017-09-25 RX ADMIN — ALBUTEROL 2.5 MILLIGRAM(S): 90 AEROSOL, METERED ORAL at 20:38

## 2017-09-25 RX ADMIN — SODIUM CHLORIDE 50 MILLILITER(S): 9 INJECTION, SOLUTION INTRAVENOUS at 15:54

## 2017-09-25 NOTE — H&P ADULT - PROBLEM SELECTOR PLAN 4
NPO until further evaluation by speech therapy  Patient is DNR as per son/HCP/Simone Levy  HCP requesting palliative care consult

## 2017-09-25 NOTE — H&P ADULT - PROBLEM SELECTOR PLAN 2
Possible aspiration PNA.  CXR shows questionable infiltrate.  Will obtain Chest CT scan, Blood culture, and Sputum culture.  Will start patient on Levaquin with neb tx.  NPO until further evaluation by speech therapy

## 2017-09-25 NOTE — PROGRESS NOTE ADULT - PROBLEM SELECTOR PLAN 1
AMS with right sided weakness  Infectious vs CVA vs worsening dementia  Will do full metabolic/infectious work up - B12, TSH, RPR, procalcitonin, urinalysis, blood/sputum cultures, cxr, chest CT, cbc, cmp  Head CT negative   MRI pending   Neuro consult appreciated AMS with right sided weakness/aphasia differential includes infectious/metabolic etiology vs CVA vs worsening dementia.    -pt remains aphasic and R sided weakness and failed swallow evaluation   -vit b12 level is low  -tsh wnl   -wbc normalized   -RPR negative and UA negative   and U cx shows <43521 GPC   -cxr negative but ct chest with evidence of spiculated lesion on the right side concerning for neoplasm   -f/u Bcx which is pending   -empirically on levaquin but no clear evidence of infection. Will discontinue and see if patient remains afebrile and with no wbc.   -c/w IVF   -f/u carotid US   -Head CT negative   -MRI head pending   -pt should be on asa/ statin and dvt ppx, unable to give the patient pills bc she cannot swallow for now will c/w suppository asa and hold statin   -Neuro consult noted and appreciated

## 2017-09-25 NOTE — H&P ADULT - RS GEN PE MLT RESP DETAILS PC
respirations non-labored/airway patent/diminished breath sounds, R/diminished breath sounds, L/rhonchi

## 2017-09-25 NOTE — PROGRESS NOTE ADULT - SUBJECTIVE AND OBJECTIVE BOX
Pt is an 86 year old female with PMH of severe dementia, CAD, and COPD who is being seen for worsening AMS x 1 month. Pt is nonverbal at baseline as per son. Son has also noticed right sided limb weakness x 4 days.      Pt seen and examined at bedside. Unable to obtain information as pt is nonverbal at baseline and son is not present. Pt laying in bed, favoring left side, non verbal.    INTERVAL HPI/OVERNIGHT EVENTS: No events overnight.     MEDICATIONS  (STANDING):  heparin  Injectable 5000 Unit(s) SubCutaneous every 12 hours  levoFLOXacin IVPB      ALBUTerol    0.083% 2.5 milliGRAM(s) Nebulizer every 6 hours    Allergies: penicillin (Rash)    PMH: Severe dementia, CAD, COPD    REVIEW OF SYSTEMS:  Unable to obtain ROS     Vital Signs Last 24 Hrs  T(C): 36.8 (25 Sep 2017 13:35), Max: 37.7 (24 Sep 2017 19:07)  T(F): 98.2 (25 Sep 2017 13:35), Max: 99.8 (24 Sep 2017 19:07)  HR: 82 (25 Sep 2017 13:35) (72 - 89)  BP: 100/62 (25 Sep 2017 13:35) (100/62 - 168/74)  BP(mean): 106 (25 Sep 2017 01:08) (106 - 106)  RR: 18 (25 Sep 2017 13:35) (16 - 20)  SpO2: 94% (25 Sep 2017 13:35) (92% - 100%)    PHYSICAL EXAM:  GENERAL: ill appearing, confused, lethargic  HEAD:  Atraumatic, Normocephalic  NERVOUS SYSTEM:  ANO x 0, favoring left side, unable to follow commands  CHEST/LUNG: diminished breath sounds  HEART: Regular rate and rhythm; No murmurs, rubs, or gallops  ABDOMEN: Soft, Nontender, Nondistended; Bowel sounds present  EXTREMITIES:  No clubbing, cyanosis, or edema      LABS:                        9.9    12.7  )-----------( 401      ( 24 Sep 2017 17:55 )             30.9     24 Sep 2017 17:55    138    |  97     |  15.0   ----------------------------<  192    4.1     |  26.0   |  0.58     Ca    9.1        24 Sep 2017 17:55  Phos  3.3       24 Sep 2017 17:55  Mg     2.0       24 Sep 2017 17:55    TPro  7.2    /  Alb  3.9    /  TBili  0.6    /  DBili  x      /  AST  26     /  ALT  16     /  AlkPhos  119    24 Sep 2017 17:55    PT/INR - ( 24 Sep 2017 17:55 )   PT: 11.6 sec;   INR: 1.05 ratio         PTT - ( 24 Sep 2017 17:55 )  PTT:25.6 sec  Urinalysis Basic - ( 24 Sep 2017 19:18 )    Color: Yellow / Appearance: Clear / S.015 / pH: x  Gluc: x / Ketone: Moderate  / Bili: Negative / Urobili: 1 mg/dL   Blood: x / Protein: 30 mg/dL / Nitrite: Negative   Leuk Esterase: Negative / RBC: 11-25 /HPF / WBC 0-2   Sq Epi: x / Non Sq Epi: x / Bacteria: x          CAPILLARY BLOOD GLUCOSE  172 (24 Sep 2017 17:23)          RADIOLOGY & ADDITIONAL TESTS:    Imaging Personally Reviewed:  [ ] YES Pt is an 86 year old female with PMH of severe dementia, CAD, and COPD who is being seen for worsening AMS x 1 month. Pt is nonverbal at baseline as per son. Son has also noticed right sided limb weakness x 4 days.      Pt seen and examined at bedside. Unable to obtain information as pt is nonverbal at baseline and son is not present. Pt laying in bed, favoring left side, non verbal. Unable to obtain ROS secondary to non communicable.     INTERVAL HPI/OVERNIGHT EVENTS: No events overnight.     MEDICATIONS  (STANDING):  heparin  Injectable 5000 Unit(s) SubCutaneous every 12 hours  levoFLOXacin IVPB      ALBUTerol    0.083% 2.5 milliGRAM(s) Nebulizer every 6 hours    Allergies: penicillin (Rash)    PMH: Severe dementia, CAD, COPD    REVIEW OF SYSTEMS:  Unable to obtain ROS     Vital Signs Last 24 Hrs  T(C): 36.8 (25 Sep 2017 13:35), Max: 37.7 (24 Sep 2017 19:07)  T(F): 98.2 (25 Sep 2017 13:35), Max: 99.8 (24 Sep 2017 19:07)  HR: 82 (25 Sep 2017 13:35) (72 - 89)  BP: 100/62 (25 Sep 2017 13:35) (100/62 - 168/74)  BP(mean): 106 (25 Sep 2017 01:08) (106 - 106)  RR: 18 (25 Sep 2017 13:35) (16 - 20)  SpO2: 94% (25 Sep 2017 13:35) (92% - 100%)    PHYSICAL EXAM:  GENERAL: ill appearing, confused, lethargic  HEAD:  Atraumatic, Normocephalic  R facial ecchymosis   NERVOUS SYSTEM:  ANO x 0, favoring left side, unable to follow commands Not moving R side.   CHEST/LUNG: diminished breath sounds  HEART: Regular rate and rhythm; No murmurs, rubs, or gallops  ABDOMEN: Soft, Nontender, Nondistended; Bowel sounds present  EXTREMITIES:  No clubbing, cyanosis, or edema      LABS:                        9.9    12.7  )-----------( 401      ( 24 Sep 2017 17:55 )             30.9     24 Sep 2017 17:55    138    |  97     |  15.0   ----------------------------<  192    4.1     |  26.0   |  0.58     Ca    9.1        24 Sep 2017 17:55  Phos  3.3       24 Sep 2017 17:55  Mg     2.0       24 Sep 2017 17:55    TPro  7.2    /  Alb  3.9    /  TBili  0.6    /  DBili  x      /  AST  26     /  ALT  16     /  AlkPhos  119    24 Sep 2017 17:55    PT/INR - ( 24 Sep 2017 17:55 )   PT: 11.6 sec;   INR: 1.05 ratio         PTT - ( 24 Sep 2017 17:55 )  PTT:25.6 sec  Urinalysis Basic - ( 24 Sep 2017 19:18 )    Color: Yellow / Appearance: Clear / S.015 / pH: x  Gluc: x / Ketone: Moderate  / Bili: Negative / Urobili: 1 mg/dL   Blood: x / Protein: 30 mg/dL / Nitrite: Negative   Leuk Esterase: Negative / RBC: 11-25 /HPF / WBC 0-2   Sq Epi: x / Non Sq Epi: x / Bacteria: x          CAPILLARY BLOOD GLUCOSE  172 (24 Sep 2017 17:23)          RADIOLOGY & ADDITIONAL TESTS:    Imaging Personally Reviewed:  [ ] YES

## 2017-09-25 NOTE — H&P ADULT - PROBLEM SELECTOR PLAN 1
Head CT scan shows no acute process.  ??Metabolic encephalopathy, although noticed right hand weakness.  Will treat underline infection, brain MRI, and Neurology consult..  Continue with neuro check

## 2017-09-25 NOTE — SWALLOW BEDSIDE ASSESSMENT ADULT - SLP GENERAL OBSERVATIONS
Pt received asleep on stretcher in ED, arousable to voice however lethargic throughout and requiring cues to maintain arousal, nonverbal, no command following, significantly reduced cognition,  +baseline wet cough

## 2017-09-25 NOTE — PROGRESS NOTE ADULT - PROBLEM SELECTOR PLAN 3
Stable.  Monitor BP Patient is on donepezil 5mg po qd but patient is not able to take it b/c pt is not swallowing.   -neurology recommended increase in donepezil and adding namenda if pt does become able to swallow

## 2017-09-25 NOTE — PROGRESS NOTE ADULT - ASSESSMENT
Patient is 86 y.o female with PMH of dementia, CAD, and COPD presenting with AMS that has been going on for the past month, and progressively worsening and right sided weakness. Differential includes AMs secondary to infectious  etiology vs CVA vs worsening dementia.  Neuro Patient is 86 y.o female with PMH of dementia, CAD, and COPD presenting with AMS that has been going on for the past month, and progressively worsening and right sided weakness. Differential includes AMS secondary to infectious etiology vs CVA vs worsening dementia.  Neuro consult placed. Patient is 86 y.o female with PMH of dementia, CAD, and COPD presenting with AMS that has been going on for the past month, and progressively worsening and right sided weakness. Differential includes AMS secondary to infectious etiology vs CVA vs worsening dementia.  Neuro consult placed. Will need social work consult prior to discharge - likely cannot go home given worsening condition. 86 year old female with PMHx severe dementia, CAD stents (2 years ago), DM2, COPD and thalassemia presented with worsening AMS from baseline; as per family patient recently became completely aphasic and was noted to not be able to move her right side and had not been eating. Pt admitted with worsening AMS differential includes infectious/metabolic etiology vs CVA vs worsening dementia.  Neuro consulted. Full CVA work up in process, carotid/TTE/MRI head pending. Pt also being ruled out for infectious source which thus far is negative. Patient also noted to have minimally displaced age indeterminate fracture of the anteromedial wall of the right maxillary sinus.

## 2017-09-25 NOTE — PROGRESS NOTE ADULT - PROBLEM SELECTOR PLAN 4
NPO until further evaluation by speech therapy  Patient is DNR as per son/HCP/Simone Levy  HCP requesting palliative care consult  Neuro consult appreciated - increase donepezil and add namenda  Social work consult - likely cannot return home given pts worsening condition -pt unable to swallow pills  -c/w asa suppository and holding statin b/c pt is unable to swallow at this time

## 2017-09-25 NOTE — H&P ADULT - ATTENDING COMMENTS
Plan of care was discussed with son/HCP in great details, All questions were answered to their satisfication.  Seems to understand, and in agreement  Prognosis Poor to guarded at this

## 2017-09-25 NOTE — SWALLOW BEDSIDE ASSESSMENT ADULT - NS SPL SWALLOW CLINIC TRIAL FT
No further PO administered at this time 2* overt s/s aspiration after small amount of PO and pt;s reduced overall arousal.

## 2017-09-25 NOTE — SWALLOW BEDSIDE ASSESSMENT ADULT - SLP PERTINENT HISTORY OF CURRENT PROBLEM
87yo female with hx of dementia, CAD, and COPD presenting with AMS that has been going on for the past month, and progressively worsen .  _ nonverbal at baseline.  + cough with green sputum X4 days.  Cough with eating food X2 day. CXR indicates questionable infiltrate

## 2017-09-25 NOTE — SWALLOW BEDSIDE ASSESSMENT ADULT - ASR SWALLOW ASPIRATION MONITOR
fever/pneumonia/throat clearing/gurgly voice/oral hygiene/change of breathing pattern/position upright (89Y)

## 2017-09-25 NOTE — PROGRESS NOTE ADULT - PROBLEM SELECTOR PLAN 2
Possible aspiration PNA.  CXR shows questionable infiltrate.    Follow up CT chest  Follow up Blood and sputum cultures   Continue levaquin   NPO until further evaluation by speech therapy ct chest shows small spiculated nodule in the right lower lobe suspicious for neoplasm. Given patients prior smoking hx likely that this could be malignant finding.   Patient's son would like the patient to be on hospice so no further investigation to be completed at this time. ct chest shows small spiculated nodule in the right lower lobe suspicious for neoplasm. Given patients prior smoking hx likely that this could be malignant finding.  Patient's son would like the patient to be on hospice so no further investigation to be completed at this time.

## 2017-09-25 NOTE — SWALLOW BEDSIDE ASSESSMENT ADULT - SWALLOW EVAL: DIAGNOSIS
Moderate oral dysphagia. Suspect pharyngeal dysphagia with puree 2* +overt s/s aspiration at bedside

## 2017-09-25 NOTE — CONSULT NOTE ADULT - SUBJECTIVE AND OBJECTIVE BOX
CHIEF COMPLAINT:  altered mentation    HPI: 86yFemale    Patient is 85yo female with hx of dementia, CAD, and COPD presenting with AMS that has been going on for the past month, and progressively worsen .  _ nonverbal at baseline.  + cough with green sputum X4 days.  Cough with eating food X2 day.  Son reports right hand weakness X4 days.  Son provides history. She has had progressive cognitive decline particularly over the past few months.  Minimally verbal.  In past few days she is much worse and family noted not moving right hand.  Has never seen neurologist. PMD has her on donepexil	    PAST MEDICAL & SURGICAL HISTORY:  CAD (coronary artery disease)  HTN (hypertension)  High cholesterol  Thalassanemia, minor  COPD (chronic obstructive pulmonary disease)  Diabetes  S/P angioplasty with stent  History of right mastoidectomy    MEDICATIONS  (STANDING):  heparin  Injectable 5000 Unit(s) SubCutaneous every 12 hours  levoFLOXacin IVPB      ALBUTerol    0.083% 2.5 milliGRAM(s) Nebulizer every 6 hours    MEDICATIONS  (PRN):    Allergies    penicillin (Rash)    Intolerances        FAMILY HISTORY:  No pertinent family history in first degree relatives          SOCIAL HISTORY:    Tobacco:  no  Alcohol:  no  Drugs:  no  lives at home with family        REVIEW OF SYSTEMS:    Relevant systems are negative except as noted in the chart, HPI, and PMH      VITAL SIGNS:  Vital Signs Last 24 Hrs  T(C): 36.6 (25 Sep 2017 07:39), Max: 37.7 (24 Sep 2017 19:07)  T(F): 97.9 (25 Sep 2017 07:39), Max: 99.8 (24 Sep 2017 19:07)  HR: 86 (25 Sep 2017 07:39) (72 - 86)  BP: 138/78 (25 Sep 2017 07:39) (138/78 - 168/74)  BP(mean): 106 (25 Sep 2017 01:08) (106 - 106)  RR: 20 (25 Sep 2017 07:39) (16 - 20)  SpO2: 98% (25 Sep 2017 07:39) (92% - 100%)    PHYSICAL EXAMINATION:    General: Well-developed, well nourished, in no acute distress.  Cardiac:  Regular rate and rhythm. No carotid bruits appreciated.  Eyes: Fundoscopic examination was deferred.  Neurologic:  - Mental Status:  Alert, awake, but non verbal. mumbles a few sounds. Does not follow commands.  Cranial Nerves II-XII:    II:  ; Visual fields are full to threat; Pupils are equal, round, and reactive to light.  III, IV, VI:  Extraocular movements are intact without nystagmus.  V:  Facial sensation is intact in the V1-V3 distribution bilaterally.  VII:  Face is symmetric  - Motor:  Strength- poor effort . Moves left side. Seems to move right side less. Right hand is in flexed position and somewhat swollen    - Reflexes:  1+ and symmetric at the knees.  Plantar responses flexor on left; extensor on right      LABS:                          9.9    12.7  )-----------( 401      ( 24 Sep 2017 17:55 )             30.9     24 Sep 2017 17:55    138    |  97     |  15.0   ----------------------------<  192    4.1     |  26.0   |  0.58     Ca    9.1        24 Sep 2017 17:55  Phos  3.3       24 Sep 2017 17:55  Mg     2.0       24 Sep 2017 17:55    TPro  7.2    /  Alb  3.9    /  TBili  0.6    /  DBili  x      /  AST  26     /  ALT  16     /  AlkPhos  119    24 Sep 2017 17:55    LIVER FUNCTIONS - ( 24 Sep 2017 17:55 )  Alb: 3.9 g/dL / Pro: 7.2 g/dL / ALK PHOS: 119 U/L / ALT: 16 U/L / AST: 26 U/L / GGT: x           PT/INR - ( 24 Sep 2017 17:55 )   PT: 11.6 sec;   INR: 1.05 ratio         PTT - ( 24 Sep 2017 17:55 )  PTT:25.6 sec      RADIOLOGY & ADDITIONAL STUDIES:    < from: CT Head No Cont (09.24.17 @ 20:47) >  No acute intracranial hemorrhage or mass effect. Marked chronic ischemic   changes.    < end of copied text >  < from: CT Head No Cont (09.24.17 @ 20:47) >  No acute intracranial hemorrhage or mass effect. Marked chronic ischemic   changes.    < end of copied text >      IMPRESSION:  Advanced dementia with recent marked decline. May simply be end stage decompensation but need to exclude metabolic and infecttious factors.  She seems to move the right side less - consider new stroke in setting of severe MVD.      PLAN:  1. Full medica- metabolic, infectious work up  2. Brain MRI is tolerated.  Or repeat CT  3.  B12, TSH, etc  4. Would increase donepezil and likely add namenda as well  5. /dispo planning. -erica bentley go home

## 2017-09-25 NOTE — H&P ADULT - HISTORY OF PRESENT ILLNESS
Patient is 85yo female with hx of dementia, CAD, and COPD presenting with AMS that has been going on for the past month, and progressively worsen .  _ nonverbal at baseline.  + cough with green sputum X4 days.  Cough with eating food X2 day.  Son reports right hand weakness X4 days.    unable to obtain HX or ROS due to dementia/nonverbral

## 2017-09-26 ENCOUNTER — TRANSCRIPTION ENCOUNTER (OUTPATIENT)
Age: 82
End: 2017-09-26

## 2017-09-26 DIAGNOSIS — R52 PAIN, UNSPECIFIED: ICD-10-CM

## 2017-09-26 DIAGNOSIS — R53.81 OTHER MALAISE: ICD-10-CM

## 2017-09-26 DIAGNOSIS — F03.90 UNSPECIFIED DEMENTIA, UNSPECIFIED SEVERITY, WITHOUT BEHAVIORAL DISTURBANCE, PSYCHOTIC DISTURBANCE, MOOD DISTURBANCE, AND ANXIETY: ICD-10-CM

## 2017-09-26 LAB
ALBUMIN SERPL ELPH-MCNC: 3.5 G/DL — SIGNIFICANT CHANGE UP (ref 3.3–5.2)
ALP SERPL-CCNC: 97 U/L — SIGNIFICANT CHANGE UP (ref 40–120)
ALT FLD-CCNC: 20 U/L — SIGNIFICANT CHANGE UP
ANION GAP SERPL CALC-SCNC: 13 MMOL/L — SIGNIFICANT CHANGE UP (ref 5–17)
AST SERPL-CCNC: 42 U/L — HIGH
BILIRUB DIRECT SERPL-MCNC: 0.1 MG/DL — SIGNIFICANT CHANGE UP (ref 0–0.3)
BILIRUB INDIRECT FLD-MCNC: 0.5 MG/DL — SIGNIFICANT CHANGE UP (ref 0.2–1)
BILIRUB SERPL-MCNC: 0.6 MG/DL — SIGNIFICANT CHANGE UP (ref 0.4–2)
BILIRUB SERPL-MCNC: 0.6 MG/DL — SIGNIFICANT CHANGE UP (ref 0.4–2)
BUN SERPL-MCNC: 11 MG/DL — SIGNIFICANT CHANGE UP (ref 8–20)
CALCIUM SERPL-MCNC: 9 MG/DL — SIGNIFICANT CHANGE UP (ref 8.6–10.2)
CHLORIDE SERPL-SCNC: 99 MMOL/L — SIGNIFICANT CHANGE UP (ref 98–107)
CO2 SERPL-SCNC: 26 MMOL/L — SIGNIFICANT CHANGE UP (ref 22–29)
CREAT SERPL-MCNC: 0.46 MG/DL — LOW (ref 0.5–1.3)
GLUCOSE SERPL-MCNC: 125 MG/DL — HIGH (ref 70–115)
HCT VFR BLD CALC: 30.4 % — LOW (ref 37–47)
HGB BLD-MCNC: 9.4 G/DL — LOW (ref 12–16)
MCHC RBC-ENTMCNC: 19.9 PG — LOW (ref 27–31)
MCHC RBC-ENTMCNC: 30.9 G/DL — LOW (ref 32–36)
MCV RBC AUTO: 64.4 FL — LOW (ref 81–99)
PLATELET # BLD AUTO: 385 K/UL — SIGNIFICANT CHANGE UP (ref 150–400)
POTASSIUM SERPL-MCNC: 3.8 MMOL/L — SIGNIFICANT CHANGE UP (ref 3.5–5.3)
POTASSIUM SERPL-SCNC: 3.8 MMOL/L — SIGNIFICANT CHANGE UP (ref 3.5–5.3)
PROCALCITONIN SERPL-MCNC: 0.08 NG/ML — SIGNIFICANT CHANGE UP (ref 0–0.04)
PROT SERPL-MCNC: 6.5 G/DL — LOW (ref 6.6–8.7)
RBC # BLD: 4.72 M/UL — SIGNIFICANT CHANGE UP (ref 4.4–5.2)
RBC # FLD: 20 % — HIGH (ref 11–15.6)
SODIUM SERPL-SCNC: 138 MMOL/L — SIGNIFICANT CHANGE UP (ref 135–145)
WBC # BLD: 12 K/UL — HIGH (ref 4.8–10.8)
WBC # FLD AUTO: 12 K/UL — HIGH (ref 4.8–10.8)

## 2017-09-26 PROCEDURE — 99223 1ST HOSP IP/OBS HIGH 75: CPT

## 2017-09-26 PROCEDURE — 99239 HOSP IP/OBS DSCHRG MGMT >30: CPT | Mod: GV

## 2017-09-26 RX ORDER — DONEPEZIL HYDROCHLORIDE 10 MG/1
10 TABLET, FILM COATED ORAL AT BEDTIME
Qty: 0 | Refills: 0 | Status: DISCONTINUED | OUTPATIENT
Start: 2017-09-26 | End: 2017-09-26

## 2017-09-26 RX ORDER — FLUOXETINE HCL 10 MG
1 CAPSULE ORAL
Qty: 0 | Refills: 0 | COMMUNITY

## 2017-09-26 RX ORDER — MORPHINE SULFATE 50 MG/1
5 CAPSULE, EXTENDED RELEASE ORAL
Qty: 15 | Refills: 0 | OUTPATIENT
Start: 2017-09-26 | End: 2017-10-01

## 2017-09-26 RX ORDER — MOMETASONE FUROATE AND FORMOTEROL FUMARATE DIHYDRATE 200; 5 UG/1; UG/1
2 AEROSOL RESPIRATORY (INHALATION)
Qty: 0 | Refills: 0 | COMMUNITY

## 2017-09-26 RX ORDER — DONEPEZIL HYDROCHLORIDE 10 MG/1
1 TABLET, FILM COATED ORAL
Qty: 0 | Refills: 0 | COMMUNITY

## 2017-09-26 RX ORDER — ROBINUL 0.2 MG/ML
0.4 INJECTION INTRAMUSCULAR; INTRAVENOUS
Qty: 3 | Refills: 0 | OUTPATIENT
Start: 2017-09-26 | End: 2017-10-01

## 2017-09-26 RX ORDER — ROBINUL 0.2 MG/ML
0.4 INJECTION INTRAMUSCULAR; INTRAVENOUS
Qty: 20 | Refills: 0 | OUTPATIENT
Start: 2017-09-26 | End: 2017-10-01

## 2017-09-26 RX ORDER — PREGABALIN 225 MG/1
1000 CAPSULE ORAL DAILY
Qty: 0 | Refills: 0 | Status: DISCONTINUED | OUTPATIENT
Start: 2017-09-26 | End: 2017-09-26

## 2017-09-26 RX ADMIN — ALBUTEROL 2.5 MILLIGRAM(S): 90 AEROSOL, METERED ORAL at 09:16

## 2017-09-26 RX ADMIN — Medication 300 MILLIGRAM(S): at 17:28

## 2017-09-26 RX ADMIN — HEPARIN SODIUM 5000 UNIT(S): 5000 INJECTION INTRAVENOUS; SUBCUTANEOUS at 17:30

## 2017-09-26 RX ADMIN — SODIUM CHLORIDE 50 MILLILITER(S): 9 INJECTION, SOLUTION INTRAVENOUS at 17:28

## 2017-09-26 RX ADMIN — PREGABALIN 1000 MICROGRAM(S): 225 CAPSULE ORAL at 17:28

## 2017-09-26 RX ADMIN — SODIUM CHLORIDE 50 MILLILITER(S): 9 INJECTION, SOLUTION INTRAVENOUS at 12:37

## 2017-09-26 RX ADMIN — HEPARIN SODIUM 5000 UNIT(S): 5000 INJECTION INTRAVENOUS; SUBCUTANEOUS at 06:45

## 2017-09-26 NOTE — DISCHARGE NOTE ADULT - PLAN OF CARE
Spiculated right sided nodule Patient with progressive decline with know advanced end stage dementia, possible CVA no infectious source noted Patient to continue on hospice services and continue with care as per the hospice physician. Comfort pack provided by hospice network. Spiculated right sided nodule concerning for neoplasm. Patient is unable to tolerate any oral intake so all oral medications are being held Bronchodilators as needed Continue with supportive care

## 2017-09-26 NOTE — DISCHARGE NOTE ADULT - CARE PLAN
Principal Discharge DX:	Altered mental status, unspecified altered mental status type  Secondary Diagnosis:	Lung abnormality  Instructions for follow-up, activity and diet:	Spiculated right sided nodule  Secondary Diagnosis:	Essential hypertension  Secondary Diagnosis:	COPD (chronic obstructive pulmonary disease)  Secondary Diagnosis:	CAD (coronary artery disease)  Secondary Diagnosis:	Advanced dementia  Secondary Diagnosis:	Hyperlipidemia Principal Discharge DX:	Altered mental status, unspecified altered mental status type  Goal:	Patient with progressive decline with know advanced end stage dementia, possible CVA no infectious source noted  Instructions for follow-up, activity and diet:	Patient to continue on hospice services and continue with care as per the hospice physician. Comfort pack provided by hospice network.  Secondary Diagnosis:	Lung abnormality  Instructions for follow-up, activity and diet:	Spiculated right sided nodule concerning for neoplasm.  Secondary Diagnosis:	Essential hypertension  Instructions for follow-up, activity and diet:	Patient is unable to tolerate any oral intake so all oral medications are being held  Secondary Diagnosis:	COPD (chronic obstructive pulmonary disease)  Instructions for follow-up, activity and diet:	Bronchodilators as needed  Secondary Diagnosis:	CAD (coronary artery disease)  Instructions for follow-up, activity and diet:	Patient is unable to tolerate any oral intake so all oral medications are being held  Secondary Diagnosis:	Advanced dementia  Instructions for follow-up, activity and diet:	Continue with supportive care  Secondary Diagnosis:	Hyperlipidemia  Instructions for follow-up, activity and diet:	Patient is unable to tolerate any oral intake so all oral medications are being held

## 2017-09-26 NOTE — GOALS OF CARE CONVERSATION - PERSONAL ADVANCE DIRECTIVE - NS PRO AD PATIENT TYPE ON CHART
Do Not Resuscitate (DNR)/Medical Orders for Life-Sustaining Treatment (MOLST)

## 2017-09-26 NOTE — GOALS OF CARE CONVERSATION - PERSONAL ADVANCE DIRECTIVE - NS PRO AD PATIENT TYPE
Health Care Proxy (HCP)
Health Care Proxy (HCP)/Do Not Resuscitate (DNR)
Do Not Resuscitate (DNR)/Medical Orders for Life-Sustaining Treatment (MOLST)

## 2017-09-26 NOTE — PROGRESS NOTE ADULT - SUBJECTIVE AND OBJECTIVE BOX
Patient is a 86y old  Female who presents with a chief complaint of cough/AMS/Right arm weakness (26 Sep 2017 13:22)      HEALTH ISSUES - PROBLEM Dx:  Pain: Pain  Dementia without behavioral disturbance, unspecified dementia type: Dementia without behavioral disturbance, unspecified dementia type  Debilitated: Debilitated  Prophylactic measure: Prophylactic measure  Essential hypertension: Essential hypertension  COPD (chronic obstructive pulmonary disease): COPD (chronic obstructive pulmonary disease)  CAD (coronary artery disease): CAD (coronary artery disease)  Lung abnormality: Lung abnormality  Dementia: Dementia  HTN (hypertension): HTN (hypertension)  Cough: Cough  Altered mental status, unspecified altered mental status type: Altered mental status, unspecified altered mental status type    INTERVAL HPI/OVERNIGHT EVENTS:  Patient seen and examined at bedside. No acute events overnight. Patient lying in bed comfortably and in no distress. Unable to obtain ROS b/c patient is non communicable.     Vital Signs Last 24 Hrs  T(C): 36.8 (26 Sep 2017 08:05), Max: 37.3 (25 Sep 2017 15:46)  T(F): 98.2 (26 Sep 2017 08:05), Max: 99.1 (25 Sep 2017 15:46)  HR: 71 (26 Sep 2017 08:05) (71 - 80)  BP: 124/70 (26 Sep 2017 08:05) (124/70 - 148/69)  BP(mean): --  RR: 18 (26 Sep 2017 08:05) (18 - 18)  SpO2: 96% (26 Sep 2017 08:05) (94% - 96%)    CAPILLARY BLOOD GLUCOSE          CONSTITUTIONAL: Cachectic, awake, alert and in no apparent distress  CARDIAC: Normal rate, regular rhythm.  Heart sounds S1, S2.  No murmurs, rubs or gallops   RESPIRATORY: Breath sounds clear and equal bilaterally. No wheezes, rhales or rhonchi  GASTROENTEROLOGY: Soft, nt nd bs+ normoactive   EXTREMITIES: No edema, cyanosis or deformity   NEUROLOGICAL: Alert and awake and not oriented non verbal still R sided weakness   SKIN: scattered areas of echymossis, skin turgor poor     MEDICATIONS  (STANDING):  heparin  Injectable 5000 Unit(s) SubCutaneous every 12 hours  ALBUTerol    0.083% 2.5 milliGRAM(s) Nebulizer every 6 hours  dextrose 5% + sodium chloride 0.9%. 1000 milliLiter(s) (50 mL/Hr) IV Continuous <Continuous>  aspirin Suppository 300 milliGRAM(s) Rectal daily  cyanocobalamin Injectable 1000 MICROGram(s) IntraMuscular daily  donepezil 10 milliGRAM(s) Oral at bedtime    MEDICATIONS  (PRN):      LABS:                        9.4    12.0  )-----------( 385      ( 26 Sep 2017 07:27 )             30.4     -    138  |  99  |  11.0  ----------------------------<  125<H>  3.8   |  26.0  |  0.46<L>    Ca    9.0      26 Sep 2017 07:27  Phos  3.3       Mg     2.0         TPro  x   /  Alb  x   /  TBili  0.6  /  DBili  x   /  AST  x   /  ALT  x   /  AlkPhos  x       PT/INR - ( 24 Sep 2017 17:55 )   PT: 11.6 sec;   INR: 1.05 ratio         PTT - ( 24 Sep 2017 17:55 )  PTT:25.6 sec  Urinalysis Basic - ( 24 Sep 2017 19:18 )    Color: Yellow / Appearance: Clear / S.015 / pH: x  Gluc: x / Ketone: Moderate  / Bili: Negative / Urobili: 1 mg/dL   Blood: x / Protein: 30 mg/dL / Nitrite: Negative   Leuk Esterase: Negative / RBC: 11-25 /HPF / WBC 0-2   Sq Epi: x / Non Sq Epi: x / Bacteria: x      LIVER FUNCTIONS - ( 26 Sep 2017 07:27 )  Alb: 3.5 g/dL / Pro: 6.5 g/dL / ALK PHOS: 97 U/L / ALT: 20 U/L / AST: 42 U/L / GGT: x             RADIOLOGY & ADDITIONAL TESTS:  No new imaging studies

## 2017-09-26 NOTE — GOALS OF CARE CONVERSATION - PERSONAL ADVANCE DIRECTIVE - CONVERSATION/DISCUSSION
Diagnosis/Hospice Referral/Palliative Care Referral/Treatment Options/MOLST Discussed
Hospice Referral
Hospice Referral

## 2017-09-26 NOTE — DISCHARGE NOTE ADULT - SECONDARY DIAGNOSIS.
Lung abnormality Essential hypertension COPD (chronic obstructive pulmonary disease) CAD (coronary artery disease) Advanced dementia Hyperlipidemia

## 2017-09-26 NOTE — PROGRESS NOTE ADULT - PROBLEM SELECTOR PLAN 1
AMS with right sided weakness/aphasia no evidence of infection could be a CVA even though ct head was negative vs progressive advanced dementia   -pt remains aphasic and R sided weakness and failed swallow evaluation   -vit b12 level is low placed on vit b12  -tsh wnl   -wbc normalized   -RPR negative and UA negative   and U cx shows <[id number] GPC   -cxr negative but ct chest with evidence of spiculated lesion on the right side concerning for neoplasm   -Head CT negative   -Patient's son Simone Perez wants only comfort measures and wanted all testing discontinued he is aware patient cannot swallow so all medications and tests will not change her management. He understand that she will continue to decline and wants her home on hospice.

## 2017-09-26 NOTE — DISCHARGE NOTE ADULT - MEDICATION SUMMARY - MEDICATIONS TO TAKE
I will START or STAY ON the medications listed below when I get home from the hospital:    morphine 20 mg/mL oral concentrate  -- 5 milligram(s) under tongue every 8 hours, As Needed MDD:MDD: 15mg   -- Caution federal law prohibits the transfer of this drug to any person other  than the person for whom it was prescribed.  Dilute this medication with liquid before administration.  May cause drowsiness.  Alcohol may intensify this effect.  Use care when operating dangerous machinery.  This prescription cannot be refilled.  Using more of this medication than prescribed may cause serious breathing problems.    -- Indication: For Pain or dyspnea     LORazepam 2 mg/mL oral concentrate  -- 0.5 milligram(s) sublingually every 12 hours, As Needed  anxiety and agitation MDD:1.0mg   -- Caution federal law prohibits the transfer of this drug to any person other  than the person for whom it was prescribed.  Dilute this medication with liquid before administration.  Do not take this drug if you are pregnant.  Keep in refrigerator.  Do not freeze.  May cause drowsiness.  Alcohol may intensify this effect.  Use care when operating dangerous machinery.    -- Indication: For Agitation and anxiety     glycopyrrolate 1 mg/5 mL oral solution  -- 0.4 mg/kg sublingually every 8 hours, As Needed  secretions   -- Check with your doctor before becoming pregnant.  May cause drowsiness.  Alcohol may intensify this effect.  Use care when operating dangerous machinery.  Obtain medical advice before taking any non-prescription drugs as some may affect the action of this medication.  Take medication on an empty stomach 1 hour before or 2 to 3 hours after a meal unless otherwise directed by your doctor.  This drug may impair the ability to drive or operate machinery.  Use care until you become familiar with its effects.    -- Indication: For secretions    Dulcolax Laxative 10 mg rectal suppository  -- 1 suppository(ies) rectally once a day, As Needed   -- For rectal use only.  Keep in refrigerator.  Do not freeze.    -- Indication: For COnstipation I will START or STAY ON the medications listed below when I get home from the hospital:    morphine 20 mg/mL oral concentrate  -- 5 milligram(s) under tongue every 8 hours, As Needed MDD:MDD: 15mg   -- Caution federal law prohibits the transfer of this drug to any person other  than the person for whom it was prescribed.  Dilute this medication with liquid before administration.  May cause drowsiness.  Alcohol may intensify this effect.  Use care when operating dangerous machinery.  This prescription cannot be refilled.  Using more of this medication than prescribed may cause serious breathing problems.    -- Indication: For Pain or dyspnea     LORazepam 2 mg/mL oral concentrate  -- 0.5 milligram(s) sublingually every 12 hours, As Needed  anxiety and agitation MDD:1.0mg   -- Caution federal law prohibits the transfer of this drug to any person other  than the person for whom it was prescribed.  Dilute this medication with liquid before administration.  Do not take this drug if you are pregnant.  Keep in refrigerator.  Do not freeze.  May cause drowsiness.  Alcohol may intensify this effect.  Use care when operating dangerous machinery.    -- Indication: For Agitation or anxiety     glycopyrrolate 1 mg/5 mL oral solution  -- 0.4 mg/kg sublingually every 8 hours, As Needed  secretions MDD:1.2mg  -- Check with your doctor before becoming pregnant.  May cause drowsiness.  Alcohol may intensify this effect.  Use care when operating dangerous machinery.  Obtain medical advice before taking any non-prescription drugs as some may affect the action of this medication.  Take medication on an empty stomach 1 hour before or 2 to 3 hours after a meal unless otherwise directed by your doctor.  This drug may impair the ability to drive or operate machinery.  Use care until you become familiar with its effects.    -- Indication: For secretions     Dulcolax Laxative 10 mg rectal suppository  -- 1 suppository(ies) rectally once a day, As Needed   -- For rectal use only.  Keep in refrigerator.  Do not freeze.    -- Indication: For COnstipation

## 2017-09-26 NOTE — PROGRESS NOTE ADULT - SUBJECTIVE AND OBJECTIVE BOX
INTERVAL HISTORY:  no changes, remains poorly responsive      VITAL SIGNS:  Vital Signs Last 24 Hrs  T(C): 36.8 (26 Sep 2017 08:05), Max: 37.3 (25 Sep 2017 15:46)  T(F): 98.2 (26 Sep 2017 08:05), Max: 99.1 (25 Sep 2017 15:46)  HR: 71 (26 Sep 2017 08:05) (71 - 89)  BP: 124/70 (26 Sep 2017 08:05) (100/62 - 148/69)  BP(mean): --  RR: 18 (26 Sep 2017 08:05) (18 - 20)  SpO2: 96% (26 Sep 2017 08:05) (94% - 96%)    PHYSICAL EXAMINATION:    Mentation:  arouses slightly to noxious stim, nonverbal, does not follow commands  Language/Speech: nonverbal  CN:PERRL  Visual Fields:  Motor: limited, moves slightly to noxious stim  Sensory:  DTR:  Babinski:      MEDS:  MEDICATIONS  (STANDING):  heparin  Injectable 5000 Unit(s) SubCutaneous every 12 hours  ALBUTerol    0.083% 2.5 milliGRAM(s) Nebulizer every 6 hours  dextrose 5% + sodium chloride 0.9%. 1000 milliLiter(s) (50 mL/Hr) IV Continuous <Continuous>  aspirin Suppository 300 milliGRAM(s) Rectal daily  cyanocobalamin Injectable 1000 MICROGram(s) IntraMuscular daily  donepezil 10 milliGRAM(s) Oral at bedtime    MEDICATIONS  (PRN):      LABS:                          9.4    12.0  )-----------( 385      ( 26 Sep 2017 07:27 )             30.4     09-26    138  |  99  |  11.0  ----------------------------<  125<H>  3.8   |  26.0  |  0.46<L>    Ca    9.0      26 Sep 2017 07:27  Phos  3.3     09-24  Mg     2.0     09-24    TPro  x   /  Alb  x   /  TBili  0.6  /  DBili  x   /  AST  x   /  ALT  x   /  AlkPhos  x   09-26    LIVER FUNCTIONS - ( 26 Sep 2017 07:27 )  Alb: 3.5 g/dL / Pro: 6.5 g/dL / ALK PHOS: 97 U/L / ALT: 20 U/L / AST: 42 U/L / GGT:        Vitamin B12, Serum: 270 pg/mL (09.25.17 @ 10:43)         RADIOLOGY & ADDITIONAL STUDIES:      IMPRESSION & PLAN:     Advanced dementia- microvscular dementia/AD  Low B12 level -will supplement-  1000mcg IM dauily x 3  Restart donepezil 10mg daily  Case discussed with Dr. Vu-  Paaliative/Comfort Care- is reasonable  Will not actively follow.   Neurologically cleared for discharge/disposition.  Please recontact as needed.

## 2017-09-26 NOTE — DISCHARGE NOTE ADULT - HOSPITAL COURSE
86 year old female with PMHx severe dementia, CAD stents (2 years ago), DM2, COPD and thalassemia presented with worsening AMS from baseline; as per family patient recently became completely aphasic and was noted to not be able to move her right side and had not been eating. Pt admitted with worsening AMS differential includes infectious/metabolic etiology vs CVA vs worsening dementia.  Neuro consulted. Full CVA work up was ordered initial ct head negative but pt with persistent symptoms Also carotid/TTE/MRI head was ordered. As well infectious work up which ruled out PNA and UTI and no other sources identified. WBC self resolved and abx were discontinued and pt remained afebrile. CT chest showed spiculate nodule in the right lung worrisome for neoplasm given patients prior hx of being a smoker/COPD could be possible. Patient also noted to have minimally displaced age indeterminate fracture of the anteromedial wall of the right maxillary sinus. Given current circumstances and patients progressive functional decline and acute change the son Simone Perez wanted to continue with comfort measures only. He requested that all tests be discontinued b/c they would not change his mothers outcome. The patient is unable to swallow any medications and failed swallow evaluation. He wants the patient to be discharged home on hospice and if and when it is needed to be transitioned to inpatient hospice. Patient to be discharged on a comfort pack and no other medications prescribed b/c she is unable to swallow. Pt to continue with care as per hospice network. Patients son is well aware that his mother is in the process of dying and wants this process to continue at home. All services were provided for home by hospice.     Discharge planning took 45 minutes

## 2017-09-26 NOTE — PROGRESS NOTE ADULT - PROBLEM SELECTOR PLAN 2
ct chest shows small spiculated nodule in the right lower lobe suspicious for neoplasm. Given patients prior smoking hx likely that this could be malignant finding.  Patient's son would like the patient to be on hospice so no further investigation to be completed at this time.

## 2017-09-26 NOTE — DISCHARGE NOTE ADULT - MEDICATION SUMMARY - MEDICATIONS TO STOP TAKING
I will STOP taking the medications listed below when I get home from the hospital:    amLODIPine  -- 5 milligram(s) by mouth once a day    atorvastatin 10 mg oral tablet  -- 1 tab(s) by mouth once a day (at bedtime)    aspirin 81 mg oral tablet  -- 1 tab(s) by mouth once a day    Dulera 100 mcg-5 mcg/inh inhalation aerosol  -- 2 puff(s) inhaled 2 times a day    FLUoxetine 20 mg oral capsule  -- 1 cap(s) by mouth once a day    donepezil 5 mg oral tablet  -- 1 tab(s) by mouth once a day (at bedtime)    ferrous sulfate 325 mg (65 mg elemental iron) oral tablet  -- 1 tab(s) by mouth once a day  -- Check with your doctor before becoming pregnant.  Do not chew, break, or crush.  May discolor urine or feces.    PriLOSEC 10 mg oral delayed release capsule  -- 1 cap(s) by mouth once a day  -- It is very important that you take or use this exactly as directed.  Do not skip doses or discontinue unless directed by your doctor.  Obtain medical advice before taking any non-prescription drugs as some may affect the action of this medication.  Swallow whole.  Do not crush.

## 2017-09-26 NOTE — PROGRESS NOTE ADULT - PROBLEM SELECTOR PLAN 3
Patient is on donepezil 5mg po qd but patient is not able to take it b/c pt is not swallowing.   -neurology recommended increase in donepezil and adding namenda if pt does become able to swallow. Son understands none of these medications will help her at this time and wants to move forward with comfort measures.

## 2017-09-26 NOTE — DISCHARGE NOTE ADULT - ADDITIONAL INSTRUCTIONS
Patient to continue on hospice services and continue with care as per the hospice physician. Comfort pack provided by hospice network.

## 2017-09-26 NOTE — PROGRESS NOTE ADULT - PROBLEM SELECTOR PLAN 6
-bp stable  -no bp meds b/c pt is unable to swallow
-bp stable  -no bp meds b/c pt is unable to swallow

## 2017-09-26 NOTE — PROGRESS NOTE ADULT - ASSESSMENT
86 year old female with PMHx severe dementia, CAD stents (2 years ago), DM2, COPD and thalassemia presented with worsening AMS from baseline; as per family patient recently became completely aphasic and was noted to not be able to move her right side and had not been eating. Pt admitted with worsening AMS differential includes infectious/metabolic etiology vs CVA vs worsening dementia.  Neuro consulted. Full CVA work up was ordered initial ct head negative but pt with persistent symptoms Also carotid/TTE/MRI head was ordered. As well infectious work up which ruled out PNA and UTI and no other sources identified. WBC self resolved and abx were discontinued and pt remained afebrile. CT chest showed spiculate nodule in the right lung worrisome for neoplasm given patients prior hx of being a smoker/COPD could be possible. Patient also noted to have minimally displaced age indeterminate fracture of the anteromedial wall of the right maxillary sinus. Given current circumstances and patients progressive functional decline and acute change the son Simone Perez wanted to continue with comfort measures only. He requested that all tests be discontinued b/c they would not change his mothers outcome. The patient is unable to swallow any medications and failed swallow evaluation. He wants the patient to be discharged home on hospice and if and when it is needed to be transitioned to inpatient hospice. Patient to be discharged on a comfort pack and no other medications prescribed b/c she is unable to swallow. Pt to continue with care as per hospice network. Patients son is well aware that his mother is in the process of dying and wants this process to continue at home. All services were provided for home by hospice.

## 2017-09-26 NOTE — PROGRESS NOTE ADULT - ATTENDING COMMENTS
Dispo: Patient seen by the palliative team and thereafter the hospice team and deemed a candidate for home hospice. All supplies will arrive 9/26/17.  Will d/w son in detail in further plan of care.
D/w Patients son Simone Perez and patient to be transferred to home hospice today to resume care as per hospice physicians at home.

## 2017-09-26 NOTE — DISCHARGE NOTE ADULT - PATIENT PORTAL LINK FT
“You can access the [employer] Patient Portal, offered by [facility], by registering with the following website: [email or web address]”

## 2017-09-26 NOTE — GOALS OF CARE CONVERSATION - PERSONAL ADVANCE DIRECTIVE - CONVERSATION DETAILS
Pt in ED awake non verbal. Pt not alert and oriented.  Pt had been home becoming more confused and unable to perform daily activities as per son Tu.  Pt has only been eating when encouraged and fed.  pt has been eating only minimally, even with family encouragement.  Family noted change in mental status over the last 2 years following her 's death.  Tu is the HCP and will bring in form.  Plan of care was reviewed with son.  Hospice was reviewed, yaz is in agreement that a Hospice Eval would be welcome with the family Hospice notified for follow-up and will contact son @ home.  Advanced Directives were also reviewed and son would like DNR/DNI to be put in place.  [facility]st form completed and placed in chart.  Dr. Vu notified of conversation with son and agrees with Hospice Eval and Molst form  Dr. Vu placed orders in chart.  Palliative will follow as needed.
Hospice services explained to patient's son Simone Levy. Hospice consents signed by son. Approval for home hospice obtained from hospice medical director. Equipment to be delivered to patient's home on 09/26/17. Hospice will remain available to assist with a safe discharge plan. Wilver JONES
Patient to be discharged to home today at 4 PM via ambulance. Patient to go home with hospice to follow patient and provide services in the community. Wilver JONES

## 2017-09-26 NOTE — CONSULT NOTE ADULT - SUBJECTIVE AND OBJECTIVE BOX
HPI:  Patient is 85yo female with hx of dementia, CAD, and COPD presenting with AMS that has been going on for the past month, and progressively worsen .  _ nonverbal at baseline.  + cough with green sputum X4 days.  Cough with eating food X2 day.  Son reports right hand weakness X4 days.    unable to obtain HX or ROS due to dementia/nonverbral (25 Sep 2017 01:08)      PERTINENT PMH REVIEWED: Yes    PAST MEDICAL & SURGICAL HISTORY:  CAD (coronary artery disease)  HTN (hypertension)  High cholesterol  Thalassanemia, minor  COPD (chronic obstructive pulmonary disease)  Diabetes  S/P angioplasty with stent  History of right mastoidectomy      SOCIAL HISTORY:  EtOH    No                                    Drugs    No                                     nonsmoker                                    Admitted from: home  Surrogate Benny [phone]  son  romy  [phone]/    FAMILY HISTORY:  No pertinent family history in first degree relatives      Baseline ADLs (prior to admission):   Dependent      Present Symptoms:     Dyspnea: SOB/respiration at 20-22  Nausea/Vomiting: No  Anxiety:  Yes//   Depression: No  Fatigue: Yes  Loss of appetite: Yes    Pain: unable to assess verbally secondary to lethargy  advanced dementia / assumed related to  fetal position/advanced  age  cachetic /janes prominences protruding not self turning             Character-            Duration-            Effect-            Factors-            Frequency-            Location-            Severity-    Review of Systems: Reviewed                     Negative:                     Positive:pain/ debility  Unable to obtain due to poor mentation   All others negative    MEDICATIONS  (STANDING):  heparin  Injectable 5000 Unit(s) SubCutaneous every 12 hours  ALBUTerol    0.083% 2.5 milliGRAM(s) Nebulizer every 6 hours  dextrose 5% + sodium chloride 0.9%. 1000 milliLiter(s) (50 mL/Hr) IV Continuous <Continuous>  aspirin Suppository 300 milliGRAM(s) Rectal daily    MEDICATIONS  (PRN):      Allergies    penicillin (Rash)    Intolerances        PHYSICAL EXAM:    Vital Signs Last 24 Hrs  T(C): 37.3 (25 Sep 2017 15:46), Max: 37.3 (25 Sep 2017 15:46)  T(F): 99.1 (25 Sep 2017 15:46), Max: 99.1 (25 Sep 2017 15:46)  HR: 80 (25 Sep 2017 15:46) (80 - 89)  BP: 148/69 (25 Sep 2017 15:46) (100/62 - 148/69)  BP(mean): --  RR: 18 (25 Sep 2017 15:46) (18 - 20)  SpO2: 94% (25 Sep 2017 15:46) (94% - 98%)    General: alert  oriented x ____ lethargic agitated                  cachexia  nonverbal  coma    Karnofsky:  %    HEENT: normal  dry mouth  ET tube/trach    Lungs: comfortable tachypnea/labored breathing  excessive secretions    CV: normal  tachycardia    GI: normal  distended  tender  no BS               PEG/NG/OG tube  constipation  last BM:     : normal  incontinent  oliguria/anuria  lambert    MSK: normal  weakness  edema             ambulatory  bedbound/wheelchair bound    Skin: normal  pressure ulcers- Stage_____  no rash    LABS:                        9.0    10.3  )-----------( 364      ( 25 Sep 2017 16:22 )             27.9     -    138  |  101  |  14.0  ----------------------------<  103  3.7   |  25.0  |  0.52    Ca    8.8      25 Sep 2017 16:22  Phos  3.3     -  Mg     2.0     -    TPro  6.1<L>  /  Alb  3.5  /  TBili  0.6  /  DBili  x   /  AST  32<H>  /  ALT  15  /  AlkPhos  93  -    PT/INR - ( 24 Sep 2017 17:55 )   PT: 11.6 sec;   INR: 1.05 ratio         PTT - ( 24 Sep 2017 17:55 )  PTT:25.6 sec  Urinalysis Basic - ( 24 Sep 2017 19:18 )    Color: Yellow / Appearance: Clear / S.015 / pH: x  Gluc: x / Ketone: Moderate  / Bili: Negative / Urobili: 1 mg/dL   Blood: x / Protein: 30 mg/dL / Nitrite: Negative   Leuk Esterase: Negative / RBC: 11-25 /HPF / WBC 0-2   Sq Epi: x / Non Sq Epi: x / Bacteria: x      I&O's Summary      RADIOLOGY & ADDITIONAL STUDIES:    ADVANCE DIRECTIVES:   DNR YES NO  Completed on:                     MOLST  YES NO   Completed on:  Living Will  YES NO   Completed on:    COUNSELING:    Face to face meeting to discuss Advanced Care Planning - Time Spent ______ Minutes.  See goals of care note.    More than 50% time spent in counseling and coordinating care. ______ Minutes.     Thank you for the opportunity to assist with the care of this patient.   [facility] [phone]. HPI:  Patient is 87yo female with hx of dementia, CAD, and COPD presenting with AMS that has been going on for the past month, and progressively worsen .  _ nonverbal at baseline.  + cough with green sputum X4 days.  Cough with eating food X2 day.  Son reports right hand weakness X4 days.    unable to obtain HX or ROS due to dementia/nonverbral (25 Sep 2017 01:08)      PERTINENT PMH REVIEWED: Yes    PAST MEDICAL & SURGICAL HISTORY:  CAD (coronary artery disease)  HTN (hypertension)  High cholesterol  Thalassanemia, minor  COPD (chronic obstructive pulmonary disease)  Diabetes  S/P angioplasty with stent  History of right mastoidectomy      SOCIAL HISTORY:  EtOH    No                                    Drugs    No                                     nonsmoker                                    Admitted from: home  Surrogate Benny [phone]  son  romy  [phone]/    FAMILY HISTORY:  No pertinent family history in first degree relatives      Baseline ADLs (prior to admission):   Dependent      Present Symptoms:     Dyspnea: SOB/respiration at 20-22  Nausea/Vomiting: No  Anxiety:  Yes//   Depression: No  Fatigue: Yes  Loss of appetite: Yes    Pain: unable to assess verbally secondary to lethargy  advanced dementia / assumed related to  fetal position/advanced  age  cachetic /janes prominences protruding not self turning             Character-            Duration-            Effect-            Factors-            Frequency-            Location-            Severity-    Review of Systems: Reviewed                     Negative:                     Positive:pain/ debility  Unable to obtain due to poor mentation   All others negative    MEDICATIONS  (STANDING):  heparin  Injectable 5000 Unit(s) SubCutaneous every 12 hours  ALBUTerol    0.083% 2.5 milliGRAM(s) Nebulizer every 6 hours  dextrose 5% + sodium chloride 0.9%. 1000 milliLiter(s) (50 mL/Hr) IV Continuous <Continuous>  aspirin Suppository 300 milliGRAM(s) Rectal daily    MEDICATIONS  (PRN):      Allergies    penicillin (Rash)    Intolerances        PHYSICAL EXAM:    Vital Signs Last 24 Hrs  T(C): 37.3 (25 Sep 2017 15:46), Max: 37.3 (25 Sep 2017 15:46)  T(F): 99.1 (25 Sep 2017 15:46), Max: 99.1 (25 Sep 2017 15:46)  HR: 80 (25 Sep 2017 15:46) (80 - 89)  BP: 148/69 (25 Sep 2017 15:46) (100/62 - 148/69)  BP(mean): --  RR: 18 (25 Sep 2017 15:46) (18 - 20)  SpO2: 94% (25 Sep 2017 15:46) (94% - 98%)    General: alert  oriented x __0__ lethargic                   cachexia     Karnofsky:  %40    HEENT: normal    Lungs: comfortable tachypnea/labored breathing     CV: normal      GI: normal     : incontinent     MSK weakness        LABS:                        9.0    10.3  )-----------( 364      ( 25 Sep 2017 16:22 )             27.9         138  |  101  |  14.0  ----------------------------<  103  3.7   |  25.0  |  0.52    Ca    8.8      25 Sep 2017 16:22  Phos  3.3     -  Mg     2.0         TPro  6.1<L>  /  Alb  3.5  /  TBili  0.6  /  DBili  x   /  AST  32<H>  /  ALT  15  /  AlkPhos  93      PT/INR - ( 24 Sep 2017 17:55 )   PT: 11.6 sec;   INR: 1.05 ratio         PTT - ( 24 Sep 2017 17:55 )  PTT:25.6 sec  Urinalysis Basic - ( 24 Sep 2017 19:18 )    Color: Yellow / Appearance: Clear / S.015 / pH: x  Gluc: x / Ketone: Moderate  / Bili: Negative / Urobili: 1 mg/dL   Blood: x / Protein: 30 mg/dL / Nitrite: Negative   Leuk Esterase: Negative / RBC: 11-25 /HPF / WBC 0-2   Sq Epi: x / Non Sq Epi: x / Bacteria: x      I&O's Summary      RADIOLOGY & ADDITIONAL STUDIES:    ADVANCE DIRECTIVES:   DNR YES NO  Completed on:                     MOLST  YES NO   Completed on:      COUNSELING:    Face to face meeting to discuss Advanced Care Planning - Time Spent ______ Minutes.  See goals of care note. plan to discuss with patsy Nichols  goals of care related to ACP  confrim and discuss     More than 50% time spent in counseling and coordinating care. ___60___ Minutes.     Thank you for the opportunity to assist with the care of this patient.   [facility] [phone].

## 2017-09-27 VITALS
SYSTOLIC BLOOD PRESSURE: 114 MMHG | OXYGEN SATURATION: 98 % | DIASTOLIC BLOOD PRESSURE: 63 MMHG | HEART RATE: 61 BPM | RESPIRATION RATE: 18 BRPM | TEMPERATURE: 99 F

## 2017-09-30 LAB
CULTURE RESULTS: SIGNIFICANT CHANGE UP
CULTURE RESULTS: SIGNIFICANT CHANGE UP
SPECIMEN SOURCE: SIGNIFICANT CHANGE UP
SPECIMEN SOURCE: SIGNIFICANT CHANGE UP

## 2017-10-02 ENCOUNTER — APPOINTMENT (OUTPATIENT)
Dept: FAMILY MEDICINE | Facility: CLINIC | Age: 82
End: 2017-10-02

## 2017-10-19 PROCEDURE — 82607 VITAMIN B-12: CPT

## 2017-10-19 PROCEDURE — 86901 BLOOD TYPING SEROLOGIC RH(D): CPT

## 2017-10-19 PROCEDURE — 99285 EMERGENCY DEPT VISIT HI MDM: CPT | Mod: 25

## 2017-10-19 PROCEDURE — 85027 COMPLETE CBC AUTOMATED: CPT

## 2017-10-19 PROCEDURE — 36415 COLL VENOUS BLD VENIPUNCTURE: CPT

## 2017-10-19 PROCEDURE — 84100 ASSAY OF PHOSPHORUS: CPT

## 2017-10-19 PROCEDURE — 83735 ASSAY OF MAGNESIUM: CPT

## 2017-10-19 PROCEDURE — 87086 URINE CULTURE/COLONY COUNT: CPT

## 2017-10-19 PROCEDURE — 84484 ASSAY OF TROPONIN QUANT: CPT

## 2017-10-19 PROCEDURE — 86900 BLOOD TYPING SEROLOGIC ABO: CPT

## 2017-10-19 PROCEDURE — 71250 CT THORAX DX C-: CPT

## 2017-10-19 PROCEDURE — 82553 CREATINE MB FRACTION: CPT

## 2017-10-19 PROCEDURE — 70486 CT MAXILLOFACIAL W/O DYE: CPT

## 2017-10-19 PROCEDURE — 85730 THROMBOPLASTIN TIME PARTIAL: CPT

## 2017-10-19 PROCEDURE — 80053 COMPREHEN METABOLIC PANEL: CPT

## 2017-10-19 PROCEDURE — 94640 AIRWAY INHALATION TREATMENT: CPT

## 2017-10-19 PROCEDURE — 82550 ASSAY OF CK (CPK): CPT

## 2017-10-19 PROCEDURE — 92610 EVALUATE SWALLOWING FUNCTION: CPT

## 2017-10-19 PROCEDURE — 84145 PROCALCITONIN (PCT): CPT

## 2017-10-19 PROCEDURE — 71045 X-RAY EXAM CHEST 1 VIEW: CPT

## 2017-10-19 PROCEDURE — 86850 RBC ANTIBODY SCREEN: CPT

## 2017-10-19 PROCEDURE — 84443 ASSAY THYROID STIM HORMONE: CPT

## 2017-10-19 PROCEDURE — 86592 SYPHILIS TEST NON-TREP QUAL: CPT

## 2017-10-19 PROCEDURE — 87040 BLOOD CULTURE FOR BACTERIA: CPT

## 2017-10-19 PROCEDURE — 81001 URINALYSIS AUTO W/SCOPE: CPT

## 2017-10-19 PROCEDURE — 92526 ORAL FUNCTION THERAPY: CPT

## 2017-10-19 PROCEDURE — 83605 ASSAY OF LACTIC ACID: CPT

## 2017-10-19 PROCEDURE — 70450 CT HEAD/BRAIN W/O DYE: CPT

## 2017-10-19 PROCEDURE — 93005 ELECTROCARDIOGRAM TRACING: CPT

## 2017-10-19 PROCEDURE — 82247 BILIRUBIN TOTAL: CPT

## 2017-10-19 PROCEDURE — 85610 PROTHROMBIN TIME: CPT

## 2017-10-19 PROCEDURE — 82248 BILIRUBIN DIRECT: CPT

## 2017-11-21 ENCOUNTER — APPOINTMENT (OUTPATIENT)
Dept: PULMONOLOGY | Facility: CLINIC | Age: 82
End: 2017-11-21

## 2018-06-12 NOTE — ED STATDOCS - CHPI ED AGGRAVATING FACTORS
Problem: Self Care Deficits Care Plan (Adult)  Goal: *Acute Goals and Plan of Care (Insert Text)  1. Patient will complete lower body bathing and dressing with SBA and adaptive equipment as needed. CONTINUE  2. Patient will complete toileting with supervision. CONTINUE  3. Patient will tolerate 23 minutes of OT treatment with less than 4 rest breaks to increase activity tolerance for ADLs. CONTINUE  4. Patient will complete functional transfers with supervision and adaptive equipment as needed. CONTINUE  5. Patient will complete upper body bathing and dressing with set up assistance and less than 2 cues. ADDED  6. Patient will sit at EOB for 15 minutes with good balance for ADLs. ADDED  7. Patient will complete grooming in standing at sink with supervision for balance. ADDED    Timeframe: 7 visits     Comments:     OCCUPATIONAL THERAPY: Daily Note, Treatment Day: 3rd and PM 6/12/2018  INPATIENT: Hospital Day: 12  Payor: [facility] [place]ARE / Plan: [place] / Product Type: Managed Care Medicare /      NAME/AGE/GENDER: Jennifer Seay is a 80 y.o. female   PRIMARY DIAGNOSIS:  Melena [K92.1]  Anemia, unspecified type [D64.9] Unable to ambulate Unable to ambulate  Procedure(s) (LRB):  ESOPHAGOGASTRODUODENOSCOPY (EGD) (N/A)  ESOPHAGOGASTRODUODENAL (EGD) BIOPSY (N/A)  1 Day Post-Op  ICD-10: Treatment Diagnosis:    · Generalized Muscle Weakness (M62.81)  · History of falling (Z91.81)   Precautions/Allergies:    fall risk Ace inhibitors      ASSESSMENT:     Ms. Adore Soto presents to hospital for above. Pt lives alone in a one-level home, where she is typically independent with ADLs. Pt denies use of AD/DME at baseline for functional mobility; pt endorses \"frequent\" falls within the last month. 6/12/2018 presents supine in bed upon arrival, AOX3, and pleasantly agreeable to OT evaluation.  Pt had just completed ambulation in hallway with PTA again prior to OT tx session, thus treatment limited Trustees of [address] [place] [address], under license to [employer]. All rights reserved      Score:  Initial: 20 Most Recent: 16 (Date: -- )    Interpretation of Tool:  Represents activities that are increasingly more difficult (i.e. Bed mobility, Transfers, Gait). Score 24 23 22-20 19-15 14-10 9-7 6     Modifier CH CI CJ CK CL CM CN      ?  Self Care:     - CURRENT STATUS: CK - 40%-59% impaired, limited or restricted    - GOAL STATUS: CI - 1%-19% impaired, limited or restricted    - D/C STATUS:  ---------------To be determined---------------  Payor: [facility] [place]ARE / [place] [id number]  Time Out: [address] +skin picking

## 2020-11-30 NOTE — ED ADULT TRIAGE NOTE - BSA (M2)
A Care Transition RN will be following this patient at discharge for 14 days due to a positive COVID-19 diagnosis.   1.61

## 2022-05-05 NOTE — ED ADULT TRIAGE NOTE - ACCOMPANIED BY
Pt is calling upset in regards to Gabapentin. Pt states she was on 600mg daily for a long time and it gave her relief. Pt states since being in the hospital they dropped her down to 100mg. Pt was in for a hospital follow up 04/14/2022 and Dr. Tayo Mcintyre increased to 200mg. Pt would like to go back to 600mg of gabapentin. Pt states she is in 10/10 pain with her feet. EMT/paramedic

## 2022-05-25 NOTE — ED ADULT NURSE NOTE - NS ED NURSE DISCH DISPOSITION
Pt sustained a ground level fall tonight when she fell out of her chair. Denies LOC. Complaining of dizziness and pain to back of her head. Also states her right ribs/flank area hurts. Discharged

## 2022-11-29 NOTE — H&P ADULT - REASON FOR ADMISSION
Provider: CE      Tech: DAVID      Patient Name: Juancarlos Gillespie     : 57      MRN: [id number]     MARTÍNEZ: 22      Age: 65      Education: 12      Ethnicity: C      Handedness: Left      Station: OP             NEUROPSYCHOLOGICAL TESTS RAW SCORE STANDARDIZED SCORE* DESCRIPTIVE RANGE**          PREMORBID ABILITY       Wide Range Acheivement Test - 5th Edition (WRAT-5) Word Reading 46 SS= 78 Below Average     Grade Eq. 5.3 --          ATTENTION AND EXECUTIVE FUNCTIONS RAW SCORE STANDARDIZED SCORE* DESCRIPTIVE RANGE**   Wechsler Adult Intelligence Scale - 4th Edition (WAIS-IV)    Digit Span Forward 8 ss= 8 Average   Digit Span Backward 5 ss= 6 Low Average   Digit Span Sequencing 1 ss= 2 Exceptionally Low   Digit Span Total 14 ss= 4 Below Average   Coding 16 ss= 2 Exceptionally Low   Similarities 11 ss= 4 Below Average   Matrix Reasoning 8 ss= 7 Low Average   Trail Making Test       Part A s,r,e 63 TS= 31 Below Average   Part B s,r,e DC @ sample  Exceptionally Low   Independent Living Scales (ILS)      Health and Safety 29 TS= 34 Low   Zayra-Casillas Executive Function System (D-KEFS) Verbal Fluency Test   Category Switching: Total Correct 7 ss= 3 Exceptionally Low   Category Switching: Total Switching 7 ss= 6 Low Average          LANGUAGE RAW SCORE STANDARDIZED SCORE* DESCRIPTIVE RANGE**   [place] Naming Test s,r,e 46 TS= 38 Low Average   D-KEFS Verbal Fluency Test       Letter Fluency Total 5-2-6 (13) ss= 3 Exceptionally Low   Category Fluency Total 9-17 (26) ss= 6 Low Average          VISUOSPATIAL PROCESSING RAW SCORE STANDARDIZED SCORE* DESCRIPTIVE RANGE**   Jg Complex Figure Test (RCFT) Copy 10.5 %ile= <=1 Exceptionally Low   RBANS       Line Orientation 12 %ile= 3-9 Below Average   WMS-IV Visual Reproduction Copy 35 %= 3-9 Below Average          LEARNING & MEMORY RAW SCORE STANDARDIZED SCORE* DESCRIPTIVE RANGE**   Wechsler Memory Scale-4th Edition (WMS-IV)     Logical Memory I 10 ss= 2 Exceptionally Low    Logical Memory II 0 ss= 1 Exceptionally Low   Logical Memory Recognition 13 %= <=2 Exceptionally Low   Visual Reproduction I 11 ss= 1 Exceptionally Low   Visual Reproduction II 0 ss= 1 Exceptionally Low   Visual Reproduction Recognition 1 %= 3-9 Below Average   Headley Verbal Learning Test - Revised (HVLT-R; Form 1)   Total Trials 1-3 3-6-6 (15) TS= 24 Exceptionally Low   Delayed Recall 0 TS= <=20 Exceptionally Low   Retention (%) 0% TS= <=20 Exceptionally Low   Recognition Hits 10 --     Recognition False Alarms 2 --     Recognition Discriminability 8 TS= 31 Below Average          PSYCHOLOGICAL & BEHAVIORAL SYMPTOMS RAW SCORE STANDARDIZED SCORE* DESCRIPTIVE RANGE**   Geriatric Depression Scale (GDS) 9 --  Minimal   Geriatric Anxiey Scale (GAS)      Somatic 1 --  Minimal   Cognitive 4 --  Mild   Affective 3 --  Minimal   Total 8 --  Minimal          PERFORMANCE VALIDITY RAW SCORE   DESCRIPTIVE RANGE**   RDS 7   Valid Range          * All standardized scores are adjusted for age. Superscripts denote additional adjustment for (s)ex, (r)ace/ethnicity, (l)anguage, and/or (e)ducation.   ** Descriptive ranges are based on American Academy of Clinical Neuropsychology (2020) consensus guidelines, or test manuals where appropriate.    WNL = within normal limits. DC = discontinued due to patient s inability to complete the test.    Standardized scores: TS = T-score; mean = 50, standard deviation =10; z = z-score; mean = 0, standard deviation = 1; ss = scaled score; mean = 10, standard deviation = 3; MAS = Allen Older Adult Normative Study age adjusted scaled score; mean = 10, standard deviation = 3; SS = standard score; mean = 100, standard deviation = 15.        fall

## 2023-12-01 NOTE — ED ADULT TRIAGE NOTE - HEIGHT IN INCHES
Discussed with Gianni Thuramn telephonically and Zoey Thurman in person. They state they are ready to make a decision on patient's code status. We had a few conversations before this regarding what a code status is and what CPR vs. DNR and intubate vs. DNI could look like. They now agree that Gita would not have wanted CPR or intubation. They believe that she would not do well if these interventions were performed on her and it would cause her unnecessary pain.
6

## 2024-02-07 NOTE — PATIENT PROFILE ADULT. - BLOOD AVOIDANCE/RESTRICTIONS, PROFILE
Latest Reference Range & Units 02/05/24 13:34   AST(SGOT) 12 - 45 U/L 51 (H)   ALT(SGPT) 2 - 50 U/L 87 (H)   (H): Data is abnormally high  New problem. Taking Aleve for migraines daily. Was also going through depression drinking ETOH.   Admits to tenderness in RUQ.   Stopped ETOH and NSAIDs.   none/unable to assess, pt nonverbal

## 2024-06-01 NOTE — CONSULT NOTE ADULT - PROBLEM/RECOMMENDATION-2
Nasir Sandoval is a 63 y.o. male on day 2 of admission presenting with Shortness of breath.      Subjective   Patient seen and examined by me.  Patient is sitting in the chair and states he is feeling better each day.  His cough is much improved but he still has some productive cough of light yellow phlegm.  Patient remains on room air and is using his CPAP at night.  Heart rate has improved and patient remains in controlled ventricular response.  I appreciate Dr. Gamboa's note from cardiology.  Patient remains on day #3 of IV Zithromax/Rocephin/IV Solu-Medrol 40 mg every 8 hours.  INR has improved to 2.6 today and patient remains on his home dosage of Coumadin.  Blood sugars have been running high in the range of 230s, 240s and also elevated due to his IV steroids.   Results of the CT of the chest noted as below.    Objective     Last Recorded Vitals  /74 (BP Location: Right arm, Patient Position: Sitting)   Pulse 66   Temp 35.9 °C (96.6 °F) (Temporal)   Resp 16   Wt (!) 160 kg (352 lb 11.8 oz)   SpO2 93%   Intake/Output last 3 Shifts:    Intake/Output Summary (Last 24 hours) at 6/1/2024 1610  Last data filed at 6/1/2024 0600  Gross per 24 hour   Intake 200 ml   Output 2 ml   Net 198 ml       Admission Weight  Weight: (!) 160 kg (352 lb) (05/30/24 1152)    Daily Weight  06/01/24 : (!) 160 kg (352 lb 11.8 oz)    Image Results  CT chest wo IV contrast  Narrative: Interpreted By:  Briana Eastman,   STUDY:  CT CHEST WO IV CONTRAST;  5/31/2024 8:47 pm      INDICATION:  Signs/Symptoms:Shortness of breath and worsening right lung  infiltrates/history of CHF.      COMPARISON:  02/20/2024      ACCESSION NUMBER(S):  [id number]      ORDERING CLINICIAN:  RAPHAEL PINON      TECHNIQUE:  Helical data acquisition of the chest was obtained  without IV  contrast material.  Images were reformatted in axial, coronal, and  sagittal planes.      FINDINGS:  LUNGS AND AIRWAYS:  The trachea and central airways are patent. No  endobronchial lesion.      Lungs are clear. No pleural effusion is present.      MEDIASTINUM AND ALEXSANDRA, LOWER NECK AND AXILLA:  The visualized thyroid gland is within normal limits.      No evidence of thoracic lymphadenopathy by CT criteria.      Esophagus appears within normal limits as seen.      HEART AND VESSELS:  Ascending aorta is dilated, measuring 4.6 cm diameter, unchanged.  Mild patchy athero sclerotic calcification is present.      Main pulmonary artery measures 3.1 cm diameter, unchanged.      Diffuse coronary artery calcifications are seen. The study is not  optimized for evaluation of coronary arteries.      The cardiac chambers are not enlarged.      No evidence of pericardial effusion.      UPPER ABDOMEN:  1.8 cm exophytic cyst extends from the upper pole of the right  kidney. Accessory splenule is noted anteriorly. Adrenal glands have  globular contour but appear unchanged and may be hypertrophied.      CHEST WALL AND OSSEOUS STRUCTURES:  There are no suspicious osseous lesions. Multilevel degenerative  changes are present      Impression: 1.  No pulmonary infiltrates are noted on CT. The chest x-ray  asymmetry is related to asymmetric soft tissue attenuation due to  rotation at the time of imaging.      MACRO:  None      Signed by: Briana Eastman 6/1/2024 10:23 AM  Dictation workstation:   [id number]      Physical Exam  Vitals and nursing note reviewed.   Constitutional:       General: He is not in acute distress.     Appearance: Normal appearance. He is morbidly obese. He is not ill-appearing or toxic-appearing.   HENT:      Head: Normocephalic and atraumatic.      Right Ear: Tympanic membrane and ear canal normal. There is no impacted cerumen.      Left Ear: Tympanic membrane, ear canal and external ear normal.      Nose: Nose normal. No congestion or rhinorrhea.      Mouth/Throat:      Pharynx: Oropharynx is clear. No oropharyngeal exudate or posterior oropharyngeal erythema.   Eyes:       General: No scleral icterus.        Right eye: No discharge.         Left eye: No discharge.      Extraocular Movements: Extraocular movements intact.      Conjunctiva/sclera: Conjunctivae normal.      Pupils: Pupils are equal, round, and reactive to light.   Neck:      Thyroid: No thyroid mass, thyromegaly or thyroid tenderness.      Vascular: No carotid bruit.   Cardiovascular:      Rate and Rhythm: Normal rate. Rhythm irregularly irregular.      Chest Wall: PMI is not displaced. No thrill.      Pulses: Normal pulses.   Pulmonary:      Effort: Pulmonary effort is normal. No respiratory distress.      Breath sounds: Rhonchi present. No wheezing or rales.      Comments: Lung exam/few scattered right lower lobe rhonchi/no rales and much improved wheezing  Abdominal:      General: Abdomen is flat. Bowel sounds are normal. There is no distension.      Palpations: Abdomen is soft. There is no mass.      Tenderness: There is no abdominal tenderness. There is no right CVA tenderness, left CVA tenderness, guarding or rebound.      Hernia: No hernia is present.   Musculoskeletal:         General: No swelling or tenderness. Normal range of motion.      Cervical back: Full passive range of motion without pain, normal range of motion and neck supple. No rigidity.      Right lower leg: No edema.      Left lower leg: No edema.   Lymphadenopathy:      Cervical: No cervical adenopathy.   Skin:     General: Skin is warm.      Coloration: Skin is not jaundiced.      Findings: No erythema or rash.   Neurological:      General: No focal deficit present.      Mental Status: He is alert and oriented to person, place, and time. Mental status is at baseline.      Sensory: No sensory deficit.      Motor: No weakness.      Gait: Gait normal.      Deep Tendon Reflexes: Reflexes normal.   Psychiatric:         Mood and Affect: Mood normal.         Thought Content: Thought content normal.         Judgment: Judgment normal.         Relevant  Results    Current Facility-Administered Medications:     acetaminophen (Tylenol) tablet 650 mg, 650 mg, oral, q4h PRN, 650 mg at 05/31/24 0902 **OR** acetaminophen (Tylenol) oral liquid 650 mg, 650 mg, oral, q4h PRN, Shon Waller PA-C    albuterol 2.5 mg /3 mL (0.083 %) nebulizer solution 3 mL, 3 mL, nebulization, q2h PRN, Shon Waller PA-C    ammonium lactate (Lac-Hydrin) 12 % lotion 1 Application, 1 Application, Topical, PRN, Shon Waller PA-C    azithromycin (Zithromax) in dextrose 5 % in water (D5W) 250 mL  mg, 500 mg, intravenous, q24h, Jada Gillespie MD, Stopped at 06/01/24 1549    bacitracin ointment 1 Application, 1 Application, Topical, BID, Shon Waller PA-C    benzonatate (Tessalon) capsule 100 mg, 100 mg, oral, TID PRN, Shon Waller PA-C    cefTRIAXone (Rocephin) 2 g in dextrose 5% in water (D5W) 50 mL, 2 g, intravenous, q24h, Jada Gillespie MD, Last Rate: 100 mL/hr at 06/01/24 1449, 2 g at 06/01/24 1449    cholecalciferol (Vitamin D-3) tablet 1,000 Units, 1,000 Units, oral, Daily, Shon Waller PA-C, 1,000 Units at 06/01/24 0849    dextrose 50 % injection 12.5 g, 12.5 g, intravenous, q15 min PRN, Shon Waller PA-C    dextrose 50 % injection 25 g, 25 g, intravenous, q15 min PRN, Shon Waller PA-C    digoxin (Lanoxin) tablet 125 mcg, 125 mcg, oral, Daily, Shon Waller PA-C, 125 mcg at 06/01/24 0849    docusate sodium (Colace) capsule 200 mg, 200 mg, oral, q AM, Shon Waller PA-C, 200 mg at 06/01/24 0849    DULoxetine (Cymbalta) DR capsule 60 mg, 60 mg, oral, Daily, Shon Waller PA-C, 60 mg at 06/01/24 0849    furosemide (Lasix) tablet 120 mg, 120 mg, oral, BID, Shon Waller PA-C, 120 mg at 06/01/24 0849    gabapentin (Neurontin) capsule 800 mg, 800 mg, oral, TID, Shon Waller PA-C, 800 mg at 06/01/24 1449    glucagon (Glucagen) injection 1 mg, 1 mg, intramuscular, q15 min PRN, Shon Waller PA-C    glucagon (Glucagen) injection 1  mg, 1 mg, intramuscular, q15 min PRN, Kathryn Liu, APRN-CNP    guaiFENesin (Mucinex) 12 hr tablet 600 mg, 600 mg, oral, BID, Cuba Cam MD, 600 mg at 06/01/24 0849    insulin lispro (HumaLOG) injection 0-10 Units, 0-10 Units, subcutaneous, TID, Shon Waller PA-C, 4 Units at 06/01/24 1213    ipratropium-albuteroL (Duo-Neb) 0.5-2.5 mg/3 mL nebulizer solution 3 mL, 3 mL, nebulization, TID, Cuba Cam MD, 3 mL at 06/01/24 1325    lisinopril tablet 2.5 mg, 2.5 mg, oral, Daily, Shon Waller PA-C, 2.5 mg at 06/01/24 0849    lubricating eye drops ophthalmic solution 1 drop, 1 drop, Left Eye, TID PRN, Shon Waller PA-C    [START ON 6/2/2024] methylPREDNISolone sod succinate (SOLU-Medrol) 40 mg/mL injection 40 mg, 40 mg, intravenous, q12h, Cuba Cam MD    metoprolol tartrate (Lopressor) tablet 25 mg, 25 mg, oral, BID, Chacorta Gamboa MD PhD, 25 mg at 06/01/24 0849    multivitamin with minerals 1 tablet, 1 tablet, oral, Daily, Cuba Cam MD, 1 tablet at 06/01/24 0849    ondansetron (Zofran) tablet 4 mg, 4 mg, oral, q6h PRN, 4 mg at 06/01/24 1212 **OR** ondansetron (Zofran) injection 4 mg, 4 mg, intravenous, q6h PRN, Shon Waller PA-C, 4 mg at 05/30/24 1847    oxygen (O2) therapy, , inhalation, Continuous PRN - O2/gases, Shon Waller PA-C    rosuvastatin (Crestor) tablet 20 mg, 20 mg, oral, Nightly, Shon Waller PA-C, 20 mg at 05/31/24 2008    tamsulosin (Flomax) 24 hr capsule 0.8 mg, 0.8 mg, oral, Nightly, Shon Waller PA-C, 0.8 mg at 05/31/24 2008    topiramate (Topamax) tablet 50 mg, 50 mg, oral, BID, Shon Waller PA-C, 50 mg at 06/01/24 0849    warfarin (Coumadin) tablet 10 mg, 10 mg, oral, Every Thursday, Shon Waller PA-C, 10 mg at 05/30/24 1839    warfarin (Coumadin) tablet 12.5 mg, 12.5 mg, oral, Once per day on Sunday Monday Tuesday Wednesday Friday Saturday, Shon Waller PA-C, 12.5 mg at 05/31/24 1715   Results for orders placed or performed  during the hospital encounter of 05/30/24 (from the past 96 hour(s))   CBC and Auto Differential   Result Value Ref Range    WBC 8.6 4.4 - 11.3 x10*3/uL    nRBC 0.0 0.0 - 0.0 /100 WBCs    RBC 4.42 (L) 4.50 - 5.90 x10*6/uL    Hemoglobin 13.9 13.5 - 17.5 g/dL    Hematocrit 41.9 41.0 - 52.0 %    MCV 95 80 - 100 fL    MCH 31.4 26.0 - 34.0 pg    MCHC 33.2 32.0 - 36.0 g/dL    RDW 14.9 (H) 11.5 - 14.5 %    Platelets 180 150 - 450 x10*3/uL    Neutrophils % 70.3 40.0 - 80.0 %    Immature Granulocytes %, Automated 0.9 0.0 - 0.9 %    Lymphocytes % 17.0 13.0 - 44.0 %    Monocytes % 9.0 2.0 - 10.0 %    Eosinophils % 2.3 0.0 - 6.0 %    Basophils % 0.5 0.0 - 2.0 %    Neutrophils Absolute 6.04 1.20 - 7.70 x10*3/uL    Immature Granulocytes Absolute, Automated 0.08 0.00 - 0.70 x10*3/uL    Lymphocytes Absolute 1.46 1.20 - 4.80 x10*3/uL    Monocytes Absolute 0.77 0.10 - 1.00 x10*3/uL    Eosinophils Absolute 0.20 0.00 - 0.70 x10*3/uL    Basophils Absolute 0.04 0.00 - 0.10 x10*3/uL   Magnesium   Result Value Ref Range    Magnesium 2.03 1.60 - 2.40 mg/dL   Comprehensive metabolic panel   Result Value Ref Range    Glucose 159 (H) 74 - 99 mg/dL    Sodium 139 136 - 145 mmol/L    Potassium 3.9 3.5 - 5.3 mmol/L    Chloride 101 98 - 107 mmol/L    Bicarbonate 28 21 - 32 mmol/L    Anion Gap 14 10 - 20 mmol/L    Urea Nitrogen 25 (H) 6 - 23 mg/dL    Creatinine 1.17 0.50 - 1.30 mg/dL    eGFR 70 >60 mL/min/1.73m*2    Calcium 9.4 8.6 - 10.3 mg/dL    Albumin 4.3 3.4 - 5.0 g/dL    Alkaline Phosphatase 68 33 - 136 U/L    Total Protein 8.0 6.4 - 8.2 g/dL    AST 21 9 - 39 U/L    Bilirubin, Total 0.4 0.0 - 1.2 mg/dL    ALT 28 10 - 52 U/L   B-Type Natriuretic Peptide   Result Value Ref Range    BNP 18 0 - 99 pg/mL   Blood Gas Venous Full Panel   Result Value Ref Range    POCT pH, Venous 7.38 7.33 - 7.43 pH    POCT pCO2, Venous 49 41 - 51 mm Hg    POCT pO2, Venous 37 35 - 45 mm Hg    POCT SO2, Venous 62 45 - 75 %    POCT Oxy Hemoglobin, Venous 60.7 45.0 -  75.0 %    POCT Hematocrit Calculated, Venous 43.0 41.0 - 52.0 %    POCT Sodium, Venous 138 136 - 145 mmol/L    POCT Potassium, Venous 4.1 3.5 - 5.3 mmol/L    POCT Chloride, Venous 99 98 - 107 mmol/L    POCT Ionized Calicum, Venous 1.15 1.10 - 1.33 mmol/L    POCT Glucose, Venous 165 (H) 74 - 99 mg/dL    POCT Lactate, Venous 2.0 0.4 - 2.0 mmol/L    POCT Base Excess, Venous 2.9 -2.0 - 3.0 mmol/L    POCT HCO3 Calculated, Venous 29.0 (H) 22.0 - 26.0 mmol/L    POCT Hemoglobin, Venous 14.4 13.5 - 17.5 g/dL    POCT Anion Gap, Venous 14.0 10.0 - 25.0 mmol/L    Patient Temperature 37.0 degrees Celsius    FiO2 21 %   Influenza A, and B PCR   Result Value Ref Range    Flu A Result Not Detected Not Detected    Flu B Result Not Detected Not Detected   Sars-CoV-2 PCR   Result Value Ref Range    Coronavirus 2019, PCR Not Detected Not Detected   RSV PCR   Result Value Ref Range    RSV PCR Not Detected Not Detected   Troponin I, High Sensitivity, Initial   Result Value Ref Range    Troponin I, High Sensitivity 11 0 - 20 ng/L   Blood Culture    Specimen: Peripheral Venipuncture; Blood culture   Result Value Ref Range    Blood Culture No growth at 1 day    Blood Culture    Specimen: Peripheral Venipuncture; Blood culture   Result Value Ref Range    Blood Culture No growth at 1 day    ECG 12 lead   Result Value Ref Range    Ventricular Rate 97 BPM    QRS Duration 108 ms    QT Interval 352 ms    QTC Calculation(Bazett) 447 ms    R Axis -75 degrees    T Axis 88 degrees    QRS Count 16 beats    Q Onset 205 ms    T Offset 381 ms    QTC Fredericia 413 ms   Coagulation Screen   Result Value Ref Range    Protime 22.1 (H) 9.8 - 12.8 seconds    INR 1.9 (H) 0.9 - 1.1    aPTT 41 (H) 27 - 38 seconds   POCT GLUCOSE   Result Value Ref Range    POCT Glucose 220 (H) 74 - 99 mg/dL   POCT GLUCOSE   Result Value Ref Range    POCT Glucose 189 (H) 74 - 99 mg/dL   CBC   Result Value Ref Range    WBC 7.6 4.4 - 11.3 x10*3/uL    nRBC 0.0 0.0 - 0.0 /100 WBCs     DISPLAY PLAN FREE TEXT RBC 4.38 (L) 4.50 - 5.90 x10*6/uL    Hemoglobin 13.6 13.5 - 17.5 g/dL    Hematocrit 42.7 41.0 - 52.0 %    MCV 98 80 - 100 fL    MCH 31.1 26.0 - 34.0 pg    MCHC 31.9 (L) 32.0 - 36.0 g/dL    RDW 14.9 (H) 11.5 - 14.5 %    Platelets 209 150 - 450 x10*3/uL   Basic metabolic panel   Result Value Ref Range    Glucose 247 (H) 74 - 99 mg/dL    Sodium 139 136 - 145 mmol/L    Potassium 4.4 3.5 - 5.3 mmol/L    Chloride 102 98 - 107 mmol/L    Bicarbonate 26 21 - 32 mmol/L    Anion Gap 15 10 - 20 mmol/L    Urea Nitrogen 25 (H) 6 - 23 mg/dL    Creatinine 1.22 0.50 - 1.30 mg/dL    eGFR 67 >60 mL/min/1.73m*2    Calcium 9.2 8.6 - 10.3 mg/dL   POCT GLUCOSE   Result Value Ref Range    POCT Glucose 241 (H) 74 - 99 mg/dL   Legionella Antigen, Urine    Specimen: Urine   Result Value Ref Range    L. pneumophila Urine Ag Negative Negative   Streptococcus pneumoniae Antigen, Urine    Specimen: Urine   Result Value Ref Range    Streptococcus pneumoniae Ag, Urine Negative Negative   POCT GLUCOSE   Result Value Ref Range    POCT Glucose 275 (H) 74 - 99 mg/dL   POCT GLUCOSE   Result Value Ref Range    POCT Glucose 255 (H) 74 - 99 mg/dL   POCT GLUCOSE   Result Value Ref Range    POCT Glucose 316 (H) 74 - 99 mg/dL   Protime-INR   Result Value Ref Range    Protime 29.9 (H) 9.8 - 12.8 seconds    INR 2.6 (H) 0.9 - 1.1   Basic Metabolic Panel   Result Value Ref Range    Glucose 220 (H) 74 - 99 mg/dL    Sodium 137 136 - 145 mmol/L    Potassium 3.8 3.5 - 5.3 mmol/L    Chloride 100 98 - 107 mmol/L    Bicarbonate 26 21 - 32 mmol/L    Anion Gap 15 10 - 20 mmol/L    Urea Nitrogen 27 (H) 6 - 23 mg/dL    Creatinine 1.07 0.50 - 1.30 mg/dL    eGFR 78 >60 mL/min/1.73m*2    Calcium 8.6 8.6 - 10.3 mg/dL   POCT GLUCOSE   Result Value Ref Range    POCT Glucose 231 (H) 74 - 99 mg/dL   POCT GLUCOSE   Result Value Ref Range    POCT Glucose 246 (H) 74 - 99 mg/dL   Lavender Top   Result Value Ref Range    Extra Tube Hold for add-ons.    POCT GLUCOSE   Result Value Ref  Range    POCT Glucose 289 (H) 74 - 99 mg/dL      Current Facility-Administered Medications:     acetaminophen (Tylenol) tablet 650 mg, 650 mg, oral, q4h PRN, 650 mg at 05/31/24 0902 **OR** acetaminophen (Tylenol) oral liquid 650 mg, 650 mg, oral, q4h PRN, Shon Waller PA-C    albuterol 2.5 mg /3 mL (0.083 %) nebulizer solution 3 mL, 3 mL, nebulization, q2h PRN, Shon Waller PA-C    ammonium lactate (Lac-Hydrin) 12 % lotion 1 Application, 1 Application, Topical, PRN, Shon Waller PA-C    azithromycin (Zithromax) in dextrose 5 % in water (D5W) 250 mL  mg, 500 mg, intravenous, q24h, Jada Gillespie MD, Stopped at 06/01/24 1549    bacitracin ointment 1 Application, 1 Application, Topical, BID, Shon Waller PA-C    benzonatate (Tessalon) capsule 100 mg, 100 mg, oral, TID PRN, Shon Waller PA-C    cefTRIAXone (Rocephin) 2 g in dextrose 5% in water (D5W) 50 mL, 2 g, intravenous, q24h, Jada Gillespie MD, Last Rate: 100 mL/hr at 06/01/24 1449, 2 g at 06/01/24 1449    cholecalciferol (Vitamin D-3) tablet 1,000 Units, 1,000 Units, oral, Daily, Shon Waller PA-C, 1,000 Units at 06/01/24 0849    dextrose 50 % injection 12.5 g, 12.5 g, intravenous, q15 min PRN, Shon Waller PA-C    dextrose 50 % injection 25 g, 25 g, intravenous, q15 min PRN, Shon Waller PA-C    digoxin (Lanoxin) tablet 125 mcg, 125 mcg, oral, Daily, Shon Waller PA-C, 125 mcg at 06/01/24 0849    docusate sodium (Colace) capsule 200 mg, 200 mg, oral, q AM, Shon Waller PA-C, 200 mg at 06/01/24 0849    DULoxetine (Cymbalta) DR capsule 60 mg, 60 mg, oral, Daily, Shon Waller PA-C, 60 mg at 06/01/24 0849    furosemide (Lasix) tablet 120 mg, 120 mg, oral, BID, Shon Waller PA-C, 120 mg at 06/01/24 0849    gabapentin (Neurontin) capsule 800 mg, 800 mg, oral, TID, Shon Waller PA-C, 800 mg at 06/01/24 1449    glucagon (Glucagen) injection 1 mg, 1 mg, intramuscular, q15 min PRN, Shon Waller,  SCOT    glucagon (Glucagen) injection 1 mg, 1 mg, intramuscular, q15 min PRN, Kathryn Liu, APRN-CNP    guaiFENesin (Mucinex) 12 hr tablet 600 mg, 600 mg, oral, BID, [place] Cam MD, 600 mg at 06/01/24 0849    insulin lispro (HumaLOG) injection 0-10 Units, 0-10 Units, subcutaneous, TID, Shon Waller PA-C, 4 Units at 06/01/24 1213    ipratropium-albuteroL (Duo-Neb) 0.5-2.5 mg/3 mL nebulizer solution 3 mL, 3 mL, nebulization, TID, [place] Cam MD, 3 mL at 06/01/24 1325    lisinopril tablet 2.5 mg, 2.5 mg, oral, Daily, Shon Waller PA-C, 2.5 mg at 06/01/24 0849    lubricating eye drops ophthalmic solution 1 drop, 1 drop, Left Eye, TID PRN, Shon Waller PA-C    [START ON 6/2/2024] methylPREDNISolone sod succinate (SOLU-Medrol) 40 mg/mL injection 40 mg, 40 mg, intravenous, q12h, Cuba Cam MD    metoprolol tartrate (Lopressor) tablet 25 mg, 25 mg, oral, BID, Chacorta Gamboa MD PhD, 25 mg at 06/01/24 0849    multivitamin with minerals 1 tablet, 1 tablet, oral, Daily, [place] Cam MD, 1 tablet at 06/01/24 0849    ondansetron (Zofran) tablet 4 mg, 4 mg, oral, q6h PRN, 4 mg at 06/01/24 1212 **OR** ondansetron (Zofran) injection 4 mg, 4 mg, intravenous, q6h PRN, Shon Waller PA-C, 4 mg at 05/30/24 1847    oxygen (O2) therapy, , inhalation, Continuous PRN - O2/gases, Shon Waller PA-C    rosuvastatin (Crestor) tablet 20 mg, 20 mg, oral, Nightly, Shon Waller PA-C, 20 mg at 05/31/24 2008    tamsulosin (Flomax) 24 hr capsule 0.8 mg, 0.8 mg, oral, Nightly, Shon Waller PA-C, 0.8 mg at 05/31/24 2008    topiramate (Topamax) tablet 50 mg, 50 mg, oral, BID, Shon Waller PA-C, 50 mg at 06/01/24 0849    warfarin (Coumadin) tablet 10 mg, 10 mg, oral, Every Thursday, Shon Waller PA-C, 10 mg at 05/30/24 1839    warfarin (Coumadin) tablet 12.5 mg, 12.5 mg, oral, Once per day on Sunday Monday Tuesday Wednesday Friday Saturday, Shon Waller PA-C, 12.5 mg at 05/31/24 7458           Assessment/Plan   This patient currently has cardiac telemetry ordered; if you would like to modify or discontinue the telemetry order, click here to go to the orders activity to modify/discontinue the order.              Principal Problem:    Shortness of breath    #1/Shortness of breath/productive cough not improving on oral antibiotics as an outpatient/admitted with severe acute bronchitis with hyperactive airway disease and wheezing/suspected right lung pneumonia on chest x-ray on admission/clinically improving on his IV Zithromax and IV Rocephin and IV Solu-Medrol.  CT of the chest done yesterday without contrast showed no infiltrates or pleural effusions and patient has history of dilated ascending aorta which remains stable at 4.6 cm/diffuse coronary artery calcifications seen on CT/pulmonary artery was 3.1 cm.  Patient is clinically improving.  Blood cultures are negative and urine for Legionella antigen and Streptococcus is negative.  Will de-escalate his IV steroids to 40 mg every 12 hours and switch to p.o. Medrol Dosepak in AM.  Will also de-escalate his IV antibiotics to p.o. Zithromax and p.o. doxycycline in AM.  Patient remains on room air.  #2/Hypertension/s/p rapid chronic atrial fibrillation/patient remains in controlled ventricular response and remains on lisinopril 2.5 mg p.o. daily and also remains on metoprolol 25 mg p.o. daily.  Patient remains on p.o. Coumadin and his INR is adequate at 2.6.  Patient is being followed by cardiology service.  3./Obstructive sleep apnea/morbid obesity and patient is using CPAP at bedtime.  He has been advised on losing weight.   #4/Type 2 diabetes and A1c was 7.3 1/2024/with hyperglycemia and worsened by his IV steroids.  Patient remains on his insulin at mealtime  .  5./History of chronic CHF/patient remains compensated.  BNP on admission was normal.  6./Patient is clinically improving.  Possible discharge in the next 24 to 48 hours.  Labs and CT  results noted.  Case discussed with the nursing Licha on the floor.  Total time spent 25 minutes/time spent on patient interaction/counseling/assessment and plan and documentation.         [place] Cam MD

## 2024-10-10 NOTE — PROGRESS NOTE ADULT - PROBLEM SELECTOR PLAN 1
Cardiology dr grigsby.       suspect chronic given neg occult stool and no BUN elevation.   s/p 2U PRBC  iron panel pending   venofer/ppi---d/c if no iron deficiency  gi vs heme eval pending studies